# Patient Record
Sex: MALE | Race: WHITE | NOT HISPANIC OR LATINO | Employment: FULL TIME | URBAN - METROPOLITAN AREA
[De-identification: names, ages, dates, MRNs, and addresses within clinical notes are randomized per-mention and may not be internally consistent; named-entity substitution may affect disease eponyms.]

---

## 2017-07-28 ENCOUNTER — HOSPITAL ENCOUNTER (EMERGENCY)
Facility: HOSPITAL | Age: 42
Discharge: HOME | End: 2017-07-28
Attending: EMERGENCY MEDICINE
Payer: MEDICAID

## 2017-07-28 VITALS
OXYGEN SATURATION: 100 % | WEIGHT: 245 LBS | TEMPERATURE: 99 F | HEIGHT: 73 IN | SYSTOLIC BLOOD PRESSURE: 138 MMHG | HEART RATE: 85 BPM | BODY MASS INDEX: 32.47 KG/M2 | DIASTOLIC BLOOD PRESSURE: 77 MMHG | RESPIRATION RATE: 16 BRPM

## 2017-07-28 DIAGNOSIS — R05.9 COUGH: Primary | ICD-10-CM

## 2017-07-28 LAB
ALBUMIN SERPL BCP-MCNC: 3.5 G/DL
ALP SERPL-CCNC: 73 U/L
ALT SERPL W/O P-5'-P-CCNC: 12 U/L
ANION GAP SERPL CALC-SCNC: 7 MMOL/L
AST SERPL-CCNC: 20 U/L
BASOPHILS # BLD AUTO: 0.04 K/UL
BASOPHILS NFR BLD: 0.6 %
BILIRUB SERPL-MCNC: 0.8 MG/DL
BUN SERPL-MCNC: 12 MG/DL
CALCIUM SERPL-MCNC: 8.8 MG/DL
CHLORIDE SERPL-SCNC: 106 MMOL/L
CK SERPL-CCNC: 181 U/L
CO2 SERPL-SCNC: 26 MMOL/L
CREAT SERPL-MCNC: 1.3 MG/DL
DIFFERENTIAL METHOD: ABNORMAL
EOSINOPHIL # BLD AUTO: 0.2 K/UL
EOSINOPHIL NFR BLD: 3.2 %
ERYTHROCYTE [DISTWIDTH] IN BLOOD BY AUTOMATED COUNT: 13.4 %
EST. GFR  (AFRICAN AMERICAN): >60 ML/MIN/1.73 M^2
EST. GFR  (NON AFRICAN AMERICAN): >60 ML/MIN/1.73 M^2
GLUCOSE SERPL-MCNC: 137 MG/DL
HCT VFR BLD AUTO: 40.4 %
HGB BLD-MCNC: 13.9 G/DL
LYMPHOCYTES # BLD AUTO: 1.8 K/UL
LYMPHOCYTES NFR BLD: 25.8 %
MCH RBC QN AUTO: 30.2 PG
MCHC RBC AUTO-ENTMCNC: 34.4 G/DL
MCV RBC AUTO: 88 FL
MONOCYTES # BLD AUTO: 0.7 K/UL
MONOCYTES NFR BLD: 10.6 %
NEUTROPHILS # BLD AUTO: 4 K/UL
NEUTROPHILS NFR BLD: 59.7 %
PLATELET # BLD AUTO: 189 K/UL
PMV BLD AUTO: 9.8 FL
POTASSIUM SERPL-SCNC: 3.8 MMOL/L
PROT SERPL-MCNC: 8 G/DL
RBC # BLD AUTO: 4.6 M/UL
SODIUM SERPL-SCNC: 139 MMOL/L
TSH SERPL DL<=0.005 MIU/L-ACNC: 3.66 UIU/ML
WBC # BLD AUTO: 6.78 K/UL

## 2017-07-28 PROCEDURE — 82550 ASSAY OF CK (CPK): CPT

## 2017-07-28 PROCEDURE — 80053 COMPREHEN METABOLIC PANEL: CPT

## 2017-07-28 PROCEDURE — 25000003 PHARM REV CODE 250: Performed by: PHYSICIAN ASSISTANT

## 2017-07-28 PROCEDURE — 96360 HYDRATION IV INFUSION INIT: CPT

## 2017-07-28 PROCEDURE — 99284 EMERGENCY DEPT VISIT MOD MDM: CPT | Mod: ,,, | Performed by: PHYSICIAN ASSISTANT

## 2017-07-28 PROCEDURE — 96361 HYDRATE IV INFUSION ADD-ON: CPT

## 2017-07-28 PROCEDURE — 84443 ASSAY THYROID STIM HORMONE: CPT

## 2017-07-28 PROCEDURE — 99284 EMERGENCY DEPT VISIT MOD MDM: CPT | Mod: 25

## 2017-07-28 PROCEDURE — 85025 COMPLETE CBC W/AUTO DIFF WBC: CPT

## 2017-07-28 RX ORDER — BENZONATATE 100 MG/1
100 CAPSULE ORAL 3 TIMES DAILY PRN
Qty: 15 CAPSULE | Refills: 0 | Status: SHIPPED | OUTPATIENT
Start: 2017-07-28 | End: 2017-08-07

## 2017-07-28 RX ORDER — ACETAMINOPHEN 325 MG/1
650 TABLET ORAL
Status: COMPLETED | OUTPATIENT
Start: 2017-07-28 | End: 2017-07-28

## 2017-07-28 RX ADMIN — ACETAMINOPHEN 650 MG: 325 TABLET ORAL at 04:07

## 2017-07-28 RX ADMIN — SODIUM CHLORIDE 1000 ML: 0.9 INJECTION, SOLUTION INTRAVENOUS at 04:07

## 2017-07-28 NOTE — ED PROVIDER NOTES
"Encounter Date: 7/28/2017    SCRIBE #1 NOTE: I, Kenya Ramos, am scribing for, and in the presence of,  Dr. Galan. I have scribed the following portions of the note - the APC attestation. Other sections scribed: Imaging.       History     Chief Complaint   Patient presents with    Emesis     "I have problems with my thyroids. Last week I threw up. Everytime I eat I throw up and when I swallow it hurts".     41-year-old white male with past medical history of hypothyroidism presents to the ED complaining of URI symptoms.  He reports a cough, sore throat, wheezing, and lightheadedness if he does not eat for long periods of time.  He had one episode of emesis one week ago and denies any current nausea.  He states that he thinks that his thyroid is acting up.  He has been compliant with his medications and has not skipped any doses.  He works outside daily but states that he drinks a lot of water and Gatorade.  He denies fever, chills, chest pain, shortness of breath, abdominal pain, dysuria, headache.  He denies tobacco, alcohol, or drug use.      The history is provided by the patient.     Review of patient's allergies indicates:  No Known Allergies  Past Medical History:   Diagnosis Date    Developmental delay     Hypothyroidism 09/19/2016     Past Surgical History:   Procedure Laterality Date    CATARACT EXTRACTION W/ INTRAOCULAR LENS IMPLANT      CHOLECYSTECTOMY       History reviewed. No pertinent family history.  Social History   Substance Use Topics    Smoking status: Never Smoker    Smokeless tobacco: Never Used    Alcohol use No     Review of Systems   Constitutional: Positive for fatigue. Negative for chills and fever.   HENT: Positive for sore throat. Negative for congestion and rhinorrhea.    Eyes: Negative for photophobia and visual disturbance.   Respiratory: Positive for cough and wheezing. Negative for shortness of breath.    Cardiovascular: Negative for chest pain.   Gastrointestinal: " Positive for vomiting (x1 one week ago). Negative for abdominal pain, constipation, diarrhea and nausea.   Genitourinary: Negative for dysuria and hematuria.   Musculoskeletal: Positive for back pain. Negative for neck pain and neck stiffness.   Skin: Negative for rash and wound.   Neurological: Positive for light-headedness. Negative for dizziness, syncope, weakness, numbness and headaches.   Psychiatric/Behavioral: Negative for confusion.       Physical Exam     Initial Vitals [07/28/17 1349]   BP Pulse Resp Temp SpO2   (!) 144/79 104 20 97.7 °F (36.5 °C) 98 %      MAP       100.67         Physical Exam    Nursing note and vitals reviewed.  Constitutional: He appears well-developed and well-nourished. He is not diaphoretic. No distress.   HENT:   Head: Normocephalic and atraumatic.   Right Ear: Tympanic membrane, external ear and ear canal normal.   Left Ear: Tympanic membrane, external ear and ear canal normal.   Mouth/Throat: Uvula is midline, oropharynx is clear and moist and mucous membranes are normal.   Neck: Normal range of motion. Neck supple.   Cardiovascular: Normal rate, regular rhythm and normal heart sounds. Exam reveals no gallop and no friction rub.    No murmur heard.  Pulmonary/Chest: Breath sounds normal. He has no wheezes. He has no rhonchi. He has no rales. He exhibits no tenderness.   Abdominal: Soft. Bowel sounds are normal. There is no tenderness. There is no rebound and no guarding.   Musculoskeletal: Normal range of motion.   Neurological: He is alert and oriented to person, place, and time.   Skin: Skin is warm and dry. No rash noted. No erythema.   Psychiatric: He has a normal mood and affect.         ED Course   Procedures  Labs Reviewed   CBC W/ AUTO DIFFERENTIAL - Abnormal; Notable for the following:        Result Value    Hemoglobin 13.9 (*)     All other components within normal limits   COMPREHENSIVE METABOLIC PANEL - Abnormal; Notable for the following:     Glucose 137 (*)      Anion Gap 7 (*)     All other components within normal limits   TSH   CK         Imaging Results          X-Ray Chest PA And Lateral (Final result)  Result time 07/28/17 16:48:21    Final result by Bobby Lemons III, MD (07/28/17 16:48:21)                 Impression:     No acute process seen.      Electronically signed by: BOBBY LEMONS  Date:     07/28/17  Time:    16:48              Narrative:    2 views: The heart size is normal.  Lungs are clear.                                X-Rays:   Independently Interpreted Readings:   Chest X-Ray: No acute process     Medical Decision Making:   History:   Old Medical Records: I decided to obtain old medical records.  Independently Interpreted Test(s):   I have ordered and independently interpreted X-rays - see prior notes.  Clinical Tests:   Lab Tests: Ordered and Reviewed  Radiological Study: Ordered and Reviewed       APC / Resident Notes:   41-year-old white male with past medical history of hypothyroidism presents to the ED complaining of URI symptoms.  Tachycardic at 104.  Regular rhythm without murmurs.  Lungs are clear to auscultation.  Abdomen is soft and nontender.  Differential diagnosis includes but isn't limited to viral URI, pneumonia, hyperthyroidism, rhabdomyolysis. Will get labs and CXR.    CBC does not show any signs of leukocytosis with WBC 6.78.  H/H 13.9/40.4.  CMP without any acute abnormality.  TSH within normal limits at 3.658.  CPK within normal limits at 181.    Chest x-ray with no acute intrathoracic abnormality.    I d not feel the patient needs any further labs or imaging at this time.  Stable for discharge.    He was discharged with a prescription for tessalon.  He will follow up with his PCP.  All of the patient's questions were answered.  I reviewed the patient's chart, labs, and imaging and discussed the case with my supervising physician.          Scribe Attestation:   Scribe #1: I performed the above scribed service and the  documentation accurately describes the services I performed. I attest to the accuracy of the note.    Attending Attestation:     Physician Attestation Statement for NP/PA:   I discussed this assessment and plan of this patient with the NP/PA, but I did not personally examine the patient. The face to face encounter was performed by the NP/PA.    Other NP/PA Attestation Additions:    History of Present Illness: 41 year old male presents with concern for thyroid abnormality. He threw up one time last week. Labs unremarkable. Pt stable for discharge.         Physician Attestation for Scribe:  Physician Attestation Statement for Scribe #1: I, Dr. Galan, reviewed documentation, as scribed by Kenya Ramos in my presence, and it is both accurate and complete.                 ED Course     Clinical Impression:   The encounter diagnosis was Cough.    Disposition:   Disposition: Discharged  Condition: Stable                        Iesha Galvan PA-C  07/28/17 6950

## 2017-07-28 NOTE — ED TRIAGE NOTES
"Pt presents to the ED c/o emesis that occurred one time last Friday. Pt reports his emesis contained blood. Pt states, "It's my male thyroid." Pt also reports wheezing this AM. Pt states, "I think it may be bronchitis."  "

## 2017-09-05 ENCOUNTER — OFFICE VISIT (OUTPATIENT)
Dept: URGENT CARE | Facility: CLINIC | Age: 42
End: 2017-09-05
Payer: MEDICAID

## 2017-09-05 VITALS
HEART RATE: 107 BPM | SYSTOLIC BLOOD PRESSURE: 139 MMHG | DIASTOLIC BLOOD PRESSURE: 73 MMHG | RESPIRATION RATE: 18 BRPM | OXYGEN SATURATION: 98 % | TEMPERATURE: 98 F | WEIGHT: 245 LBS | HEIGHT: 73 IN | BODY MASS INDEX: 32.47 KG/M2

## 2017-09-05 DIAGNOSIS — J06.9 UPPER RESPIRATORY TRACT INFECTION, UNSPECIFIED TYPE: Primary | ICD-10-CM

## 2017-09-05 PROCEDURE — 3008F BODY MASS INDEX DOCD: CPT | Mod: S$GLB,,, | Performed by: NURSE PRACTITIONER

## 2017-09-05 PROCEDURE — 94640 AIRWAY INHALATION TREATMENT: CPT | Mod: S$GLB,,, | Performed by: NURSE PRACTITIONER

## 2017-09-05 PROCEDURE — 99203 OFFICE O/P NEW LOW 30 MIN: CPT | Mod: 25,S$GLB,, | Performed by: NURSE PRACTITIONER

## 2017-09-05 RX ORDER — ALBUTEROL SULFATE 0.83 MG/ML
2.5 SOLUTION RESPIRATORY (INHALATION)
Status: COMPLETED | OUTPATIENT
Start: 2017-09-05 | End: 2017-09-05

## 2017-09-05 RX ORDER — DEXAMETHASONE SODIUM PHOSPHATE 100 MG/10ML
10 INJECTION INTRAMUSCULAR; INTRAVENOUS
Status: COMPLETED | OUTPATIENT
Start: 2017-09-05 | End: 2017-09-05

## 2017-09-05 RX ORDER — BENZONATATE 200 MG/1
200 CAPSULE ORAL 3 TIMES DAILY PRN
Qty: 30 CAPSULE | Refills: 0 | Status: SHIPPED | OUTPATIENT
Start: 2017-09-05 | End: 2017-09-15

## 2017-09-05 RX ORDER — AZITHROMYCIN 250 MG/1
TABLET, FILM COATED ORAL
Qty: 6 TABLET | Refills: 0 | Status: SHIPPED | OUTPATIENT
Start: 2017-09-05 | End: 2018-02-27

## 2017-09-05 RX ADMIN — DEXAMETHASONE SODIUM PHOSPHATE 10 MG: 100 INJECTION INTRAMUSCULAR; INTRAVENOUS at 05:09

## 2017-09-05 RX ADMIN — ALBUTEROL SULFATE 2.5 MG: 0.83 SOLUTION RESPIRATORY (INHALATION) at 05:09

## 2017-09-05 NOTE — PROGRESS NOTES
"Subjective:       Patient ID: Sonu Antunez is a 41 y.o. male.    Vitals:  height is 6' 1" (1.854 m) and weight is 111.1 kg (245 lb). His temperature is 98 °F (36.7 °C). His blood pressure is 139/73 and his pulse is 107. His respiration is 18 and oxygen saturation is 98%.     Chief Complaint: Cough    This is a 41 y.o. male with Past Medical History:  No date: Developmental delay  09/19/2016: Hypothyroidism   who presents today with a chief complaint of cough and congestion.         Cough   This is a new problem. The current episode started in the past 7 days. The problem has been gradually worsening. The problem occurs constantly. The cough is productive of sputum. Associated symptoms include headaches, nasal congestion, postnasal drip and sweats. Pertinent negatives include no chest pain, chills, ear pain, eye redness, fever, myalgias, sore throat, shortness of breath or wheezing. Nothing aggravates the symptoms. He has tried nothing for the symptoms. His past medical history is significant for pneumonia. There is no history of asthma or COPD.     Review of Systems   Constitution: Negative for chills, fever and malaise/fatigue.   HENT: Positive for congestion and postnasal drip. Negative for ear pain, hoarse voice and sore throat.    Eyes: Negative for discharge and redness.   Cardiovascular: Negative for chest pain, dyspnea on exertion and leg swelling.   Respiratory: Positive for cough. Negative for shortness of breath, sputum production and wheezing.    Musculoskeletal: Negative for myalgias.   Gastrointestinal: Negative for abdominal pain and nausea.   Neurological: Positive for headaches.       Objective:      Physical Exam   Constitutional: He is oriented to person, place, and time. He appears well-developed and well-nourished. He is cooperative.  Non-toxic appearance. He does not appear ill. No distress.   HENT:   Head: Normocephalic and atraumatic.   Right Ear: Hearing, tympanic membrane, external " ear and ear canal normal.   Left Ear: Hearing, tympanic membrane, external ear and ear canal normal.   Nose: Nose normal. No mucosal edema, rhinorrhea or nasal deformity. No epistaxis. Right sinus exhibits no maxillary sinus tenderness and no frontal sinus tenderness. Left sinus exhibits no maxillary sinus tenderness and no frontal sinus tenderness.   Mouth/Throat: Uvula is midline, oropharynx is clear and moist and mucous membranes are normal. No trismus in the jaw. Normal dentition. No uvula swelling. No posterior oropharyngeal erythema.   Eyes: Conjunctivae and lids are normal. No scleral icterus.   Sclera clear bilat   Neck: Trachea normal, full passive range of motion without pain and phonation normal. Neck supple.   Cardiovascular: Normal rate, regular rhythm, normal heart sounds, intact distal pulses and normal pulses.    Pulmonary/Chest: Effort normal. No respiratory distress. He has decreased breath sounds (THROUGHOUT. INTERMITTENT DRY COUGH).   LUNGS SOUND IMPROVED AFTER TREATMENT ON AUSCULATION  PATIENT REPORTS FEELING BETTER   Abdominal: Soft. Normal appearance and bowel sounds are normal. He exhibits no distension. There is no tenderness.   Musculoskeletal: Normal range of motion. He exhibits no edema or deformity.   Neurological: He is alert and oriented to person, place, and time. He exhibits normal muscle tone. Coordination normal.   Skin: Skin is warm, dry and intact. He is not diaphoretic. No pallor.   Psychiatric: He has a normal mood and affect. His speech is normal and behavior is normal. Judgment and thought content normal. Cognition and memory are normal.   Nursing note and vitals reviewed.    NORMAL CHEST XRAY  Assessment:       1. Upper respiratory tract infection, unspecified type        Plan:         Upper respiratory tract infection, unspecified type  -     albuterol nebulizer solution 2.5 mg; Take 3 mLs (2.5 mg total) by nebulization one time.  -     azithromycin (ZITHROMAX Z-FIONA) 250 MG  tablet; TAKE 2 TABLETS ON DAY 1, THEN 1 TABLET DAILY X 4 DAYS.  Dispense: 6 tablet; Refill: 0  -     benzonatate (TESSALON) 200 MG capsule; Take 1 capsule (200 mg total) by mouth 3 (three) times daily as needed for Cough.  Dispense: 30 capsule; Refill: 0  -     X-Ray Chest PA And Lateral; Future; Expected date: 09/05/2017    Please drink plenty of fluids.  Please get plenty of rest.  Please return here or go to the Emergency Department for any concerns or worsening of condition.  If you were prescribed antibiotics, please take them to completion.  If you were given wait & see antibiotics, please wait 5-7 days before taking them, and only take them if your symptoms have worsened or not improved.  If you do begin taking the antibiotics, please take them to completion.  If you were prescribed a narcotic medication, do not drive or operate heavy equipment or machinery while taking these medications.  If you were given a steroid shot in the clinic and have also been given a prescription for a steroid such as Prednisone or a Medrol Dose Pack, please begin taking them tomorrow.  If you do not have Hypertension or any history of palpitations, it is ok to take over the counter Sudafed or Mucinex D or Allegra-D or Claritin-D or Zyrtec-D.  If you do take one of the above, it is ok to combine that with plain over the counter Mucinex or Allegra or Claritin or Zyrtec.  If for example you are taking Zyrtec -D, you can combine that with Mucinex, but not Mucinex-D.  If you are taking Mucinex-D, you can combine that with plain Allegra or Claritin or Zyrtec.   If you do have Hypertension or palpitations, it is safe to take Coricidin HBP for relief of sinus symptoms.  If not allergic, please take over the counter Tylenol (Acetaminophen) and/or Motrin (Ibuprofen) as directed for control of pain and/or fever.  Please follow up with your primary care doctor or specialist as needed.    If you  smoke, please stop smoking.

## 2017-09-05 NOTE — LETTER
September 5, 2017      [facility]  [address]  [facility] [address]  Phone: [phone]  Fax: [phone]       Patient: Sonu Antunez   YOB: 1975  Date of Visit: 09/05/2017    To Whom It May Concern:    Lianna Antunez  was at [facility] on 09/05/2017. He may return to work/school on 09/08/2017 with no restrictions. If you have any questions or concerns, or if I can be of further assistance, please do not hesitate to contact me.    Sincerely,    Luke Pantoja MA

## 2017-09-05 NOTE — PATIENT INSTRUCTIONS
NORMAL CHEST XRAY      Viral Upper Respiratory Illness with Wheezing (Adult)  You have a viral upper respiratory illness (URI), which is another term for the common cold. When the infection causes a lot of irritation, the air passages can go into spasm. This causes wheezing and shortness of breath.    This illness is contagious during the first few days. It is spread through the air by coughing and sneezing. It may also be spread by direct contact (touching the sick person and then touching your own eyes, nose, or mouth). Frequent handwashing will decrease the risk.  Most viral illnesses go away within 7 to 10 days with rest and simple home remedies. Sometimes the illness may last for several weeks. Antibiotics will not kill a virus, and they are generally not prescribed for this condition.  Home care  · If symptoms are severe, rest at home for the first 2 to 3 days. When you resume activity, don't let yourself get too tired.  · Avoid being exposed to cigarette smoke (yours or others).  · You may use acetaminophen or ibuprofen to control pain and fever, unless another medicine was prescribed. (Note: If you have chronic liver or kidney disease, have ever had a stomach ulcer or gastrointestinal bleeding, or are taking blood-thinning medicines, talk with your healthcare provider before using these medicines.) Aspirin should never be given to anyone under 18 years of age who is ill with a viral infection or fever. It may cause severe liver or brain damage.  · Your appetite may be poor, so a light diet is fine. Avoid dehydration by drinking 6 to 8 glasses of fluids per day (water, soft drinks, juices, tea, or soup). Extra fluids will help loosen secretions in the nose and lungs.  · Over-the-counter cold medicines will not shorten the length of time youre sick, but they may be helpful for the following symptoms: cough, sore throat, and nasal and sinus congestion. (Note: Do not use decongestants if you have high blood  pressure.)  Follow-up care  Follow up with your healthcare provider, or as advised.  When to seek medical advice  Call your healthcare provider right away if any of these occur:  · Cough with lots of colored sputum (mucus)  · Severe headache; face, neck, or ear pain  · Difficulty swallowing due to throat pain  · Fever of 100.4ºF (38ºC) or higher, or as directed by your healthcare provider  Call 911, or get immediate medical care  Call emergency services right away if any of these occur:  · Chest pain, shortness of breath, worsening wheezing, or difficulty breathing  · Coughing up blood  · Inability to swallow due to throat pain  Date Last Reviewed: 9/13/2015  © 1464-7778 [employer]. [address] [place] [address], [place] [address]. All rights reserved. This information is not intended as a substitute for professional medical care. Always follow your healthcare professional's instructions.

## 2017-12-27 ENCOUNTER — HOSPITAL ENCOUNTER (EMERGENCY)
Facility: HOSPITAL | Age: 42
Discharge: HOME | End: 2017-12-27
Payer: MEDICARE

## 2017-12-27 VITALS
OXYGEN SATURATION: 99 % | SYSTOLIC BLOOD PRESSURE: 138 MMHG | WEIGHT: 240 LBS | RESPIRATION RATE: 20 BRPM | HEART RATE: 89 BPM | TEMPERATURE: 98 F | DIASTOLIC BLOOD PRESSURE: 72 MMHG | BODY MASS INDEX: 31.66 KG/M2

## 2017-12-27 DIAGNOSIS — J06.9 UPPER RESPIRATORY TRACT INFECTION, UNSPECIFIED TYPE: ICD-10-CM

## 2017-12-27 DIAGNOSIS — R05.9 COUGH: Primary | ICD-10-CM

## 2017-12-27 PROCEDURE — 99283 EMERGENCY DEPT VISIT LOW MDM: CPT

## 2017-12-27 PROCEDURE — 99283 EMERGENCY DEPT VISIT LOW MDM: CPT | Mod: ,,, | Performed by: EMERGENCY MEDICINE

## 2017-12-27 RX ORDER — BENZONATATE 100 MG/1
100 CAPSULE ORAL 3 TIMES DAILY PRN
Qty: 15 CAPSULE | Refills: 0 | Status: SHIPPED | OUTPATIENT
Start: 2017-12-27 | End: 2017-12-27

## 2017-12-27 RX ORDER — BENZONATATE 100 MG/1
100 CAPSULE ORAL 3 TIMES DAILY PRN
Qty: 15 CAPSULE | Refills: 0 | Status: SHIPPED | OUTPATIENT
Start: 2017-12-27 | End: 2018-01-01

## 2017-12-27 NOTE — ED PROVIDER NOTES
Encounter Date: 12/27/2017       History     Chief Complaint   Patient presents with    Cough     Since 12-23, states needs Dr note to return to work    Sore Throat     HPI     41yo man with hx developmental delay, hypothyroidism presents with dry cough, congestion, sore throat since Saturday. Denies fever, nausea, vomiting, diarrhea, rash. Has taken over the counter cold medicine without much improvement. States he needs a note to return to work.     Review of patient's allergies indicates:  No Known Allergies  Past Medical History:   Diagnosis Date    Developmental delay     Hypothyroidism 09/19/2016     Past Surgical History:   Procedure Laterality Date    CATARACT EXTRACTION W/ INTRAOCULAR LENS IMPLANT      CHOLECYSTECTOMY       Family History   Problem Relation Age of Onset    Family history unknown: Yes     Social History   Substance Use Topics    Smoking status: Never Smoker    Smokeless tobacco: Never Used    Alcohol use No     Review of Systems   Constitutional: Negative for fever.   HENT: Positive for congestion, rhinorrhea and sore throat.    Respiratory: Positive for cough. Negative for shortness of breath.    Cardiovascular: Negative for chest pain.   Gastrointestinal: Negative for abdominal pain, diarrhea, nausea and vomiting.   Genitourinary: Negative for dysuria.   Musculoskeletal: Negative for myalgias.   Neurological: Negative for syncope.   All other systems reviewed and are negative.      Physical Exam     Initial Vitals [12/27/17 0802]   BP Pulse Resp Temp SpO2   138/72 89 20 97.9 °F (36.6 °C) 99 %      MAP       94         Physical Exam  Appearance: No acute distress.  Skin: No rashes seen.  Good turgor.  No abrasions.  No ecchymoses.  Eyes: No conjunctival injection.  ENT: Oropharynx clear.    Chest: Clear to auscultation bilaterally.  Good air movement.  No wheezes.  No rhonchi.  Cardiovascular: Regular rate and rhythm.  No murmurs. No gallops. No rubs.  Abdomen: Soft.  Not  distended.  Nontender.  No guarding.  No rebound. No Masses  Musculoskeletal: Good range of motion all joints.  No deformities.  Neck supple.  No meningismus.  Neurologic: Motor intact.  Sensation intact.  Cerebellar intact.  Cranial nerves intact.  Mental Status:  Alert and oriented x 3.  Appropriate, conversant.        ED Course   Procedures  Labs Reviewed - No data to display       MDM: Flower Fink MD HO-II 8:53 AM  This is a 43yo man with hx hypothyroidism, developmental delay presenting with five days of cold symptoms. Differential includes URI, influenza, pneumonia. Patient is well appearing, afebrile and with clear breath sounds bilaterally. Plan for discharge with tessalon pearls and return precautions.         Attending Note:  Physician Attestation Statement: I have personally seen and examined this patient. As the supervising MD I agree with the above history. As the supervising MD I agree with the above PE. As the supervising MD I agree with the above treatment, course, plan, and disposition.                         ED Course      Clinical Impression:   The encounter diagnosis was Cough.                           Flower Fink MD  Resident  12/27/17 [id number]       Atul Botello MD  01/03/18 [id number]

## 2017-12-27 NOTE — ED TRIAGE NOTES
Presents to ER with complaint of sore throat, cough and chest congestion since Saturday.    GENERAL: The patient is well-developed and well-nourished mentally challenged male in no apparent distress. Alert and oriented x3.                                                HEENT: Head is normocephalic and atraumatic. Extraocular muscles are intact. Pupils are equal, round, and reactive to light and accommodation. Nares appeared normal. Mouth is well hydrated and without lesions. Mucous membranes are moist. Posterior pharynx clear of any exudate or lesions.    NECK: Supple. No carotid bruits. No lymphadenopathy or thyromegaly.    LUNGS: Clear to auscultation.    HEART: Regular rate and rhythm without murmur.     ABDOMEN: Soft, nontender, and nondistended. Positive bowel sounds. No hepatosplenomegaly was noted.     EXTREMITIES: Without any cyanosis, clubbing, rash, lesions or edema.     NEUROLOGIC: Cranial nerves II through XII are grossly intact.     PSYCHIATRIC: Flat affect, but denies suicidal or homicidal ideations.    SKIN: No ulceration or induration present.

## 2017-12-27 NOTE — LETTER
"  [facility]  [address]  [facility] [address]  Phone: [phone]  Fax: [phone]   December 27, 2017    Patient: Sonu Antunez (Terry)   YOB: 1975   Date of Visit: 12/27/2017   Patient ID [id number]       To Whom It May Concern:    Sonu Antunez (Terry) was seen and treated in our emergency department on 12/27/2017. He may return to work on December 27, 2017.      Sincerely,          ,       "

## 2018-02-27 ENCOUNTER — HOSPITAL ENCOUNTER (EMERGENCY)
Facility: HOSPITAL | Age: 43
Discharge: HOME | End: 2018-02-27
Payer: MEDICARE

## 2018-02-27 VITALS
HEART RATE: 110 BPM | SYSTOLIC BLOOD PRESSURE: 134 MMHG | RESPIRATION RATE: 18 BRPM | TEMPERATURE: 98 F | WEIGHT: 245 LBS | HEIGHT: 73 IN | BODY MASS INDEX: 32.47 KG/M2 | DIASTOLIC BLOOD PRESSURE: 84 MMHG | OXYGEN SATURATION: 99 %

## 2018-02-27 DIAGNOSIS — H61.20 IMPACTED CERUMEN, UNSPECIFIED LATERALITY: ICD-10-CM

## 2018-02-27 DIAGNOSIS — H61.23 IMPACTED CERUMEN OF BOTH EARS: ICD-10-CM

## 2018-02-27 DIAGNOSIS — J06.9 UPPER RESPIRATORY TRACT INFECTION, UNSPECIFIED TYPE: ICD-10-CM

## 2018-02-27 DIAGNOSIS — J02.9 VIRAL PHARYNGITIS: Primary | ICD-10-CM

## 2018-02-27 PROCEDURE — 99283 EMERGENCY DEPT VISIT LOW MDM: CPT

## 2018-02-27 RX ORDER — LEVOTHYROXINE SODIUM 100 UG/1
100 TABLET ORAL DAILY
COMMUNITY

## 2018-02-27 RX ORDER — NAPROXEN 500 MG/1
500 TABLET ORAL EVERY 12 HOURS PRN
Qty: 20 TABLET | Refills: 0 | Status: SHIPPED | OUTPATIENT
Start: 2018-02-27 | End: 2018-06-19

## 2018-02-27 NOTE — ED TRIAGE NOTES
Patient states sore throat, swollen lymph nodes, kenny ear pain, worse after cleaning ears out  onset Sunday. Needs work excuse.

## 2018-02-27 NOTE — ED PROVIDER NOTES
Encounter Date: 2/27/2018       History     Chief Complaint   Patient presents with    Lymphadenopathy     ear ache    Sore Throat     HPI this is a 42-year-old man with a history of developmental delay and hypothyroidism here with complaints of sore throat, runny nose, nasal congestion and postnasal drip ×4 days.  Also complains of associated ear pain after cleaning his ears 2 days ago.      Review of patient's allergies indicates:  No Known Allergies  Past Medical History:   Diagnosis Date    Cellulitis     Developmental delay     Hypothyroidism 09/19/2016    URI (upper respiratory infection)      Past Surgical History:   Procedure Laterality Date    CATARACT EXTRACTION W/ INTRAOCULAR LENS IMPLANT      CHOLECYSTECTOMY       Family History   Problem Relation Age of Onset    Family history unknown: Yes     Social History   Substance Use Topics    Smoking status: Never Smoker    Smokeless tobacco: Never Used    Alcohol use No     Review of Systems  GENERAL:   (-) fever,  (-) chills,  (-) general weakness,  EYES:  (-) Eye discharge, (-) Eye redness,  ENT:  (+) sore throat, (+) Nasal congestion  RESPIRATORY:   (-) SOB,  (+) cough,  GASTROINTESTINAL:  (-) Nausea, (-) Vomiting,  INTEGUMENTARY:  (-) Abrasion(s),  MUSCULOSKELETAL:  (-) Joint pain, (-) Myalgias,  ALLERGIC/IMMUN:  (+) Rhinorrhea,        Physical Exam     Initial Vitals [02/27/18 0900]   BP Pulse Resp Temp SpO2   134/84 110 18 98 °F (36.7 °C) 99 %      MAP       100.67         Physical Exam  Physical Exam     Nursing note and vitals reviewed.    Constitutional: Well developed, well nourished.  No acute distress    Head: Atraumatic. Normocephalic.     Eyes: PERRL. EOMI. Conjunctivae- normal    ENT: Deviated septum.Mucous membranes are moist and intact. Oropharynx is clear and symmetric.  Bilateral cerumen impaction.  Patent airway.    Neck: Soft, Supple. No C-spine TTP     Cardiovascular: Regular rate. Regular rhythm. No murmurs, rubs, or  gallops.    Pulmonary/Chest: No respiratory distress. BS Clear to auscultation bilaterally. No rales or rhonchi. Increased respiratory effort    Abdominal: Soft and non-distended. There is no tenderness. No rebound, guarding, or rigidity.    Back: Full ROM. Nontender.    Extremities: Musculoskeletal: Normal range of motion. Pt exhibits no edema and no tenderness.    Skin: Skin is warm and dry. No rash.    Neurological: awake and alert . Appropriate. Moves all extremities symmetrically. No motor/sensory deficits    Psychiatric: Good eye contact. Normal interaction  ED Course   Procedures  Labs Reviewed - No data to display     This is urgent evaluation for 42-year-old man here with URI symptoms complaining of a sore throat and ear pain.  Upon evaluation the patient has a clear oropharynx with no significant exudate, erythema or swelling.  There is very mild cervical lymphadenopathy.  I do not suspect bacterial pharyngitis as the patient has no fever and has multiple URI symptoms.  Will treat his infection with Debr and his URI symptoms with supportive care.  Patient struck her to follow up with his PCP within 2-3 days for reevaluation and further treatment.ox                           Clinical Impression:   The primary encounter diagnosis was Viral pharyngitis. Diagnoses of Upper respiratory tract infection, unspecified type and Impacted cerumen, unspecified laterality were also pertinent to this visit.                           Sharon Abdalla MD  02/27/18 4959

## 2018-02-27 NOTE — ED NOTES
Patient identifiers verified and correct for Mr Antunez  C/C: Sore throat  APPEARANCE: awake and alert in NAD.  SKIN: warm, dry and intact. No breakdown or bruising.  MUSCULOSKELETAL: Patient moving all extremities spontaneously, no obvious swelling or deformities noted. Ambulates independently.  RESPIRATORY: Denies shortness of breath.Respirations unlabored.   CARDIAC: Denies CP, 2+ distal pulses; no peripheral edema  ABDOMEN: S/ND/NT, Denies nausea  : voids spontaneously, denies difficulty  Neurologic: AAO x 4; follows commands equal strength in all extremities; denies numbness/tingling. Denies dizziness Positive sore throat, kenny ear pain worse after cleaning ears out.

## 2018-06-14 ENCOUNTER — HOSPITAL ENCOUNTER (EMERGENCY)
Facility: HOSPITAL | Age: 43
Discharge: HOME | End: 2018-06-14
Attending: EMERGENCY MEDICINE
Payer: MEDICARE

## 2018-06-14 VITALS
OXYGEN SATURATION: 98 % | TEMPERATURE: 98 F | DIASTOLIC BLOOD PRESSURE: 100 MMHG | SYSTOLIC BLOOD PRESSURE: 150 MMHG | HEART RATE: 80 BPM | RESPIRATION RATE: 18 BRPM

## 2018-06-14 DIAGNOSIS — M25.519 SHOULDER PAIN: ICD-10-CM

## 2018-06-14 PROCEDURE — 99284 EMERGENCY DEPT VISIT MOD MDM: CPT | Mod: 25

## 2018-06-14 PROCEDURE — 96372 THER/PROPH/DIAG INJ SC/IM: CPT

## 2018-06-14 PROCEDURE — 63600175 PHARM REV CODE 636 W HCPCS: Performed by: PHYSICIAN ASSISTANT

## 2018-06-14 PROCEDURE — 99283 EMERGENCY DEPT VISIT LOW MDM: CPT | Mod: ,,, | Performed by: PHYSICIAN ASSISTANT

## 2018-06-14 RX ORDER — NAPROXEN 500 MG/1
500 TABLET ORAL 2 TIMES DAILY WITH MEALS
Qty: 20 TABLET | Refills: 0 | Status: SHIPPED | OUTPATIENT
Start: 2018-06-14 | End: 2018-06-19 | Stop reason: DRUGHIGH

## 2018-06-14 RX ORDER — METHOCARBAMOL 750 MG/1
750 TABLET, FILM COATED ORAL 3 TIMES DAILY
Qty: 30 TABLET | Refills: 0 | Status: SHIPPED | OUTPATIENT
Start: 2018-06-14 | End: 2018-06-19

## 2018-06-14 RX ORDER — KETOROLAC TROMETHAMINE 30 MG/ML
10 INJECTION, SOLUTION INTRAMUSCULAR; INTRAVENOUS
Status: COMPLETED | OUTPATIENT
Start: 2018-06-14 | End: 2018-06-14

## 2018-06-14 RX ORDER — ORPHENADRINE CITRATE 30 MG/ML
60 INJECTION INTRAMUSCULAR; INTRAVENOUS
Status: COMPLETED | OUTPATIENT
Start: 2018-06-14 | End: 2018-06-14

## 2018-06-14 RX ADMIN — KETOROLAC TROMETHAMINE 10 MG: 30 INJECTION, SOLUTION INTRAMUSCULAR at 03:06

## 2018-06-14 RX ADMIN — ORPHENADRINE CITRATE 60 MG: 30 INJECTION INTRAMUSCULAR; INTRAVENOUS at 03:06

## 2018-06-14 NOTE — DISCHARGE INSTRUCTIONS
Use naproxen and Robaxin as needed for pain  Follow up with a primary care doctor  Return to the ER for any new symptoms

## 2018-06-14 NOTE — ED TRIAGE NOTES
Pt reports he began to have left sided shoulder and neck pain a few days ago. Pt reports pain has gotten worse tonight and it woke him up. Pt reports it feels like 'some one is stabbing through my neck straight to my lung'. Pt reports some SOB with pain. Denies any chest pain. Pt states he took two tylenol with no releif earlier this evienng. Pt does a physical labor type job.

## 2018-06-14 NOTE — ED PROVIDER NOTES
Encounter Date: 6/14/2018       History     Chief Complaint   Patient presents with    Shoulder Pain     Pt brought in by  EMS for left shoulder pain radiating to neck x 1 week.Non traumatic. Denies chest pain.      42 year old male with a PMHx of developmental delay, hypothyroidism who presents to the ED with L shoulder and neck pain. He reports that after heavy lifting last week he began having severe L sided neck pain that radiates into his posterior shoulder and has been constant since. Denies any trauma or acute incident. He describes that pain as sharp, constant, worse with movement, better with rest. No relief with Tylenol. Denies any fevers, chills, swelling, redness, rash, chest pain, SOB, or any other medical complaints.           Review of patient's allergies indicates:  No Known Allergies  Past Medical History:   Diagnosis Date    Cellulitis     Developmental delay     Hypothyroidism 09/19/2016    URI (upper respiratory infection)      Past Surgical History:   Procedure Laterality Date    CATARACT EXTRACTION W/ INTRAOCULAR LENS IMPLANT      CHOLECYSTECTOMY       Family History   Problem Relation Age of Onset    Family history unknown: Yes     Social History   Substance Use Topics    Smoking status: Never Smoker    Smokeless tobacco: Never Used    Alcohol use No     Review of Systems   Constitutional: Negative for chills, diaphoresis and fever.   HENT: Negative for sore throat.    Respiratory: Negative for shortness of breath.    Cardiovascular: Negative for chest pain.   Gastrointestinal: Negative for nausea and vomiting.   Genitourinary: Negative for dysuria.   Musculoskeletal: Positive for neck pain. Negative for back pain and joint swelling.        +left shoulder pain   Skin: Negative for rash.   Neurological: Negative for weakness, numbness and headaches.   Hematological: Does not bruise/bleed easily.       Physical Exam     Initial Vitals [06/14/18 0330]   BP Pulse Resp Temp SpO2   (!)  150/100 80 18 98.4 °F (36.9 °C) 98 %      MAP       --         Physical Exam    Constitutional: Vital signs are normal. He appears well-developed and well-nourished.   HENT:   Head: Normocephalic and atraumatic.   Eyes: Conjunctivae are normal.   Neck: Muscular tenderness present.   Cardiovascular: Normal rate and regular rhythm.   Abdominal: Soft. Normal appearance and bowel sounds are normal.   Musculoskeletal: Normal range of motion.   Tenderness to palpation over midline C spine and posterior aspect of L shoulder. No crepitus or step offs. Patient is holding left arm close to body for comfort. Decreased ROM secondary to pain. Severe pain with abduction of L arm at shoulder. No swelling or erythema.    Neurological: He is alert and oriented to person, place, and time.   Skin: Skin is warm and intact.   Psychiatric: He has a normal mood and affect. His speech is normal and behavior is normal. Cognition and memory are normal.         ED Course   Procedures  Labs Reviewed - No data to display       No orders to display        Medical Decision Making:   ED Management:  42-year-old male with pain to the left shoulder.  No blunt force trauma.  No acute findings on x-rays.  Pain improved with medication in the ED.  Patient will be discharged home with muscle relaxants and NSAIDs.  Recommend follow-up with PCP                      Clinical Impression:   Diagnoses of Shoulder pain and Shoulder pain were pertinent to this visit.      Disposition:   Disposition: Discharged  Condition: Stable                        Anirudh Varghese PA-C  06/14/18 0507

## 2018-06-19 ENCOUNTER — HOSPITAL ENCOUNTER (EMERGENCY)
Facility: HOSPITAL | Age: 43
Discharge: HOME | End: 2018-06-19
Attending: EMERGENCY MEDICINE
Payer: MEDICARE

## 2018-06-19 VITALS
BODY MASS INDEX: 33.13 KG/M2 | SYSTOLIC BLOOD PRESSURE: 133 MMHG | HEART RATE: 94 BPM | RESPIRATION RATE: 18 BRPM | OXYGEN SATURATION: 99 % | DIASTOLIC BLOOD PRESSURE: 98 MMHG | TEMPERATURE: 98 F | HEIGHT: 73 IN | WEIGHT: 250 LBS

## 2018-06-19 DIAGNOSIS — M54.2 NECK PAIN: ICD-10-CM

## 2018-06-19 DIAGNOSIS — M62.838 MUSCLE SPASMS OF NECK: Primary | ICD-10-CM

## 2018-06-19 PROCEDURE — 99283 EMERGENCY DEPT VISIT LOW MDM: CPT | Mod: ,,, | Performed by: EMERGENCY MEDICINE

## 2018-06-19 PROCEDURE — 25000003 PHARM REV CODE 250: Performed by: EMERGENCY MEDICINE

## 2018-06-19 PROCEDURE — 93010 ELECTROCARDIOGRAM REPORT: CPT | Mod: ,,, | Performed by: INTERNAL MEDICINE

## 2018-06-19 PROCEDURE — 99283 EMERGENCY DEPT VISIT LOW MDM: CPT | Mod: 25

## 2018-06-19 PROCEDURE — 93005 ELECTROCARDIOGRAM TRACING: CPT

## 2018-06-19 RX ORDER — NAPROXEN 500 MG/1
500 TABLET ORAL EVERY 12 HOURS PRN
Qty: 20 TABLET | Refills: 0 | Status: SHIPPED | OUTPATIENT
Start: 2018-06-19 | End: 2018-06-22

## 2018-06-19 RX ORDER — METHOCARBAMOL 500 MG/1
1000 TABLET, FILM COATED ORAL 3 TIMES DAILY
Qty: 18 TABLET | Refills: 0 | Status: SHIPPED | OUTPATIENT
Start: 2018-06-19 | End: 2018-06-22

## 2018-06-19 RX ORDER — NAPROXEN 500 MG/1
500 TABLET ORAL
Status: COMPLETED | OUTPATIENT
Start: 2018-06-19 | End: 2018-06-19

## 2018-06-19 RX ORDER — METHOCARBAMOL 500 MG/1
1000 TABLET, FILM COATED ORAL
Status: COMPLETED | OUTPATIENT
Start: 2018-06-19 | End: 2018-06-19

## 2018-06-19 RX ADMIN — METHOCARBAMOL 1000 MG: 500 TABLET ORAL at 07:06

## 2018-06-19 RX ADMIN — NAPROXEN 500 MG: 500 TABLET ORAL at 07:06

## 2018-06-20 NOTE — ED TRIAGE NOTES
"Sonu Antunez, a 42 y.o. male presents to the ED c/o left sided neck and shoulder pain going into left arm. Pt reports pulling heavy bags at work Sunday and thinks he pulled a muscle. Denies CP/SOB. Pt reports this aggravated pain but this pain has been going on for "some time"      Chief Complaint   Patient presents with    Neck Pain     and going down my L arm, pulled muscle sunday after lifting garbage bag popped in chest     Review of patient's allergies indicates:  No Known Allergies  Past Medical History:   Diagnosis Date    Cellulitis     Developmental delay     Hypothyroidism 09/19/2016    URI (upper respiratory infection)       Adult Physical Assessment  LOC: Sonu Antunez, 42 y.o. male verified via two identifiers.  The patient is awake, alert, oriented and speaking appropriately at this time.  APPEARANCE: Patient resting comfortably and appears to be in no acute distress at this time. Patient is clean and well groomed, patient's clothing is properly fastened.  SKIN:The skin is warm and dry, color consistent with ethnicity, patient has normal skin turgor and moist mucus membranes, skin intact, no breakdown or brusing noted.  MUSCULOSKELETAL: Patient moving all extremities well, no obvious swelling or deformities noted. Left sided neck and shoulder pain.  RESPIRATORY: Airway is open and patent, respirations are spontaneous, patient has a normal effort and rate, no accessory muscle use noted.  CARDIAC: Patient has a normal rate and rhythm, no periphreal edema noted in any extremity, capillary refill < 3 seconds in all extremities  ABDOMEN: Soft and non tender to palpation, no abdominal distention noted. Bowel sounds present in all four quadrants.  NEUROLOGIC: Eyes open spontaneously, behavior appropriate to situation, follows commands, facial expression symmetrical, bilateral hand grasp equal and even, purposeful motor response noted, normal sensation in all extremities when touched with a finger.   "

## 2018-06-20 NOTE — ED PROVIDER NOTES
Encounter Date: 6/19/2018    SCRIBE #1 NOTE: I, Baudilioeladia Hua, am scribing for, and in the presence of,  Dr. Joseph. I have scribed the entire note.       History     Chief Complaint   Patient presents with    Neck Pain     and going down my L arm, pulled muscle sunday after lifting garbage bag popped in chest     Time patient was seen by the provider: 7:45 PM      The patient is a 42 y.o. Male with PMHx of: developmental delay who presents to the ED with a complaint of neck pain, which began approx 1 week ago. Pain started while straining to lift heavy object. Pain radiates to the left arm and chest. Pain worsened after lifting garbage bags 2 days ago. X-ray from June 14th cervical spin, shoulder, and scapula were negative. He was evaluated at that time for the same injury. Pt was discharged with muscle relaxers. Pt has been non complaint with this regimen as he has been unable to fill the prescription 2/2 developmental delay. He denies any weakness, numbness, vision changes, headache, or shortness of breath.       The history is provided by the patient, a caregiver and medical records.     Review of patient's allergies indicates:  No Known Allergies  Past Medical History:   Diagnosis Date    Cellulitis     Developmental delay     Hypothyroidism 09/19/2016    URI (upper respiratory infection)      Past Surgical History:   Procedure Laterality Date    CATARACT EXTRACTION W/ INTRAOCULAR LENS IMPLANT      CHOLECYSTECTOMY       Family History   Problem Relation Age of Onset    Family history unknown: Yes     Social History   Substance Use Topics    Smoking status: Never Smoker    Smokeless tobacco: Never Used    Alcohol use No     Review of Systems   Constitutional: Negative for fever.   HENT: Negative for sore throat.    Eyes: Negative for visual disturbance.   Respiratory: Negative for shortness of breath.    Cardiovascular: Positive for chest pain.   Gastrointestinal: Negative for nausea.    Genitourinary: Negative for dysuria.   Musculoskeletal: Positive for neck pain. Negative for back pain.        Arm pain   Skin: Negative for rash.   Neurological: Negative for weakness, numbness and headaches.   Hematological: Does not bruise/bleed easily.       Physical Exam     Initial Vitals [06/19/18 1653]   BP Pulse Resp Temp SpO2   (!) 137/99 98 18 98.4 °F (36.9 °C) 100 %      MAP       --         Physical Exam    Nursing note and vitals reviewed.  Constitutional: He appears well-developed and well-nourished. He is not diaphoretic. No distress.   HENT:   Head: Normocephalic and atraumatic.   Mouth/Throat: Oropharynx is clear and moist.   Eyes: EOM are normal. Right eye exhibits no discharge. Left eye exhibits no discharge.   Neck: Neck supple.   No carotid bruits.   Cardiovascular: Normal rate, regular rhythm, normal heart sounds and intact distal pulses. Exam reveals no gallop and no friction rub.    No murmur heard.  Pulmonary/Chest: Breath sounds normal. No respiratory distress.   No chest tenderness    Abdominal: Soft. Bowel sounds are normal. He exhibits no distension. There is no tenderness. There is no rebound and no guarding.   Musculoskeletal:    Pain and spasm to the left upper thoracic strap muscles. No midline thoracic tenderness or step offs    Neurological: He is alert and oriented to person, place, and time. He has normal strength. No cranial nerve deficit.   Skin: Skin is warm and dry. Capillary refill takes less than 2 seconds.         ED Course   Procedures  Labs Reviewed - No data to display  EKG Readings: (Independently Interpreted)   normal sinus rhythm, rate 98, normal axis, normal ST segments, normal t waves        Imaging Results    None          Medical Decision Making:   History:   Old Medical Records: I decided to obtain old medical records.  Initial Assessment:   42 y.o. male with history of developmental delay presents with persistent neck and back spasms. He was recently seen in  the ED for the same complaint and had negative imaging but has been non compliant with his medications. Pt has full strength in his bilateral upper extremities, no midline cervical tenderness to palpation, I doubt acute radicular nerve root compression. There are no meningeal signs or fever to suggest meningitis. There was no significant cervical spine trauma or midline tenderness to suggest cervical spinal fracture. His cranial nerves are intact, there are no carotid bruits, I doubt carotid dissection. No chest pain, I doubt ACS. Pt has zeke spasm and tenderness of the upper thoracic strap muscles and has been non complaint with his medications. I discussed appropriate doses with the pt's assistant and he assures me they will take medications as prescribed. Will discharge with refill of naproxen and robaxin. Provided with extensive return precautions.   Independently Interpreted Test(s):   I have ordered and independently interpreted EKG Reading(s) - see prior notes  Clinical Tests:   Medical Tests: Ordered and Reviewed            Scribe Attestation:   Scribe #1: I performed the above scribed service and the documentation accurately describes the services I performed. I attest to the accuracy of the note.    Attending Attestation:           Physician Attestation for Scribe:      Comments: I, Dr. Camron Joseph, personally performed the services described in this documentation. All medical record entries made by the scribe were at my direction and in my presence.  I have reviewed the chart and agree that the record reflects my personal performance and is accurate and complete. Camron Joseph MD.  12:45 AM 06/20/2018                 Clinical Impression:   The primary encounter diagnosis was Muscle spasms of neck. A diagnosis of Neck pain was also pertinent to this visit.      Disposition:   Disposition: Discharged  Condition: Stable                        Camron Joseph MD  06/20/18 0045

## 2018-06-21 ENCOUNTER — HOSPITAL ENCOUNTER (EMERGENCY)
Facility: HOSPITAL | Age: 43
Discharge: HOME | End: 2018-06-22
Attending: EMERGENCY MEDICINE
Payer: MEDICARE

## 2018-06-21 DIAGNOSIS — T14.8XXA MUSCLE STRAIN: Primary | ICD-10-CM

## 2018-06-21 PROCEDURE — 96372 THER/PROPH/DIAG INJ SC/IM: CPT

## 2018-06-21 PROCEDURE — 99283 EMERGENCY DEPT VISIT LOW MDM: CPT | Mod: 25

## 2018-06-21 RX ORDER — ORPHENADRINE CITRATE 30 MG/ML
60 INJECTION INTRAMUSCULAR; INTRAVENOUS
Status: COMPLETED | OUTPATIENT
Start: 2018-06-21 | End: 2018-06-22

## 2018-06-21 RX ORDER — KETOROLAC TROMETHAMINE 30 MG/ML
30 INJECTION, SOLUTION INTRAMUSCULAR; INTRAVENOUS
Status: COMPLETED | OUTPATIENT
Start: 2018-06-21 | End: 2018-06-22

## 2018-06-22 VITALS
SYSTOLIC BLOOD PRESSURE: 164 MMHG | BODY MASS INDEX: 33.13 KG/M2 | HEART RATE: 59 BPM | DIASTOLIC BLOOD PRESSURE: 101 MMHG | WEIGHT: 250 LBS | RESPIRATION RATE: 18 BRPM | OXYGEN SATURATION: 98 % | HEIGHT: 73 IN | TEMPERATURE: 98 F

## 2018-06-22 PROCEDURE — 63600175 PHARM REV CODE 636 W HCPCS: Performed by: EMERGENCY MEDICINE

## 2018-06-22 RX ORDER — NAPROXEN 500 MG/1
500 TABLET ORAL EVERY 12 HOURS PRN
Qty: 20 TABLET | Refills: 0 | Status: SHIPPED | OUTPATIENT
Start: 2018-06-22

## 2018-06-22 RX ORDER — METHOCARBAMOL 500 MG/1
1000 TABLET, FILM COATED ORAL 3 TIMES DAILY
Qty: 18 TABLET | Refills: 0 | Status: SHIPPED | OUTPATIENT
Start: 2018-06-22 | End: 2018-06-25

## 2018-06-22 RX ADMIN — ORPHENADRINE CITRATE 60 MG: 30 INJECTION INTRAMUSCULAR; INTRAVENOUS at 12:06

## 2018-06-22 RX ADMIN — KETOROLAC TROMETHAMINE 30 MG: 30 INJECTION, SOLUTION INTRAMUSCULAR at 12:06

## 2018-06-22 NOTE — ED NOTES
Complaining of left arm pain x 5 days. Pt was seen in ED for complaint.  States patient was given prescription but caregiver never got prescription filled.

## 2018-06-22 NOTE — DISCHARGE INSTRUCTIONS
Additional instructions  Followup with your primary care physician in 1 week if you are not improving. Take all your medications as prescribed. Return to the emergency department if you have increasing pain, chest pain, difficulty breathing,  nonstop vomiting, or any other concerns. Be sure to drink plenty of fluids to stay hydrated. Get plenty of rest. Please refer to additional educational material for further instructions.

## 2018-06-22 NOTE — ED NOTES
Patient identifiers for Sonu Antunez checked and correct.  LOC: The patient is awake, alert and aware of environment with an appropriate affect, the patient is oriented x 3 and speaking appropriately.  APPEARANCE: Patient uncomfortable. and in no acute distress, patient is clean and well groomed, patient's clothing are properly fastened.  SKIN: The skin is warm and dry, patient has normal skin turgor and moist mucus membranes, skin intact, no breakdown or brusing noted.  MUSKULOSKELETAL: Patient moving all extremities, no obvious swelling or deformities noted.  RESPIRATORY: Airway is open and patent, respirations are spontaneous, patient has a normal effort and rate.

## 2018-06-22 NOTE — ED PROVIDER NOTES
Encounter Date: 6/21/2018    SCRIBE #1 NOTE: I, Ian Cast, am scribing for, and in the presence of,  Dr. Agustin. I have scribed the entire note.       History     Chief Complaint   Patient presents with    Arm Pain     arm pain from left elbow to shoulder after lifting heavy garbage bag 5 days ago. pt. was seen in ed and pain medication for muscle strain. brother is with pt. today and reports he is a [place] with caregivers in home who report the pt. rx. were never filled.  pain is wxacerbated by movement with limited rom.      CHIEF COMPLAINT: Patient presents with: left arm pain.       HISTORY OF PRESENT ILLNESS: Sonu Antunez who is a 42 y.o. presents to the emergency department today with complaint of left arm pain from elbow to shoulder after lifting a heavy garbage bag 5 days ago. His pain is exacerbated with movement of his arm, and he is also experieincing decreased ROM secondary to the pain. Patient denies any numbness, weakness, fever, or any other concerning symptoms. He was seen in the ED on 6/14, and had imaging performed with no acute findings. Patient also returned to the ED on 6/19. The patient's brother reports that his prescriptions for pain medication were never given to the patient, and he is unsure if the patient's caretakers filled the prescriptions. He has taken no OTC meds. Denies any new injury or trauma.     ALLERGIES REVIEWED  MEDICATIONS REVIEWED  PMH/PSH/SOC/FH REVIEWED     The history is provided by the patient and his brother.    Nursing/Ancillary staff note reviewed.          Review of patient's allergies indicates:  No Known Allergies  Past Medical History:   Diagnosis Date    Cellulitis     Developmental delay     Hypothyroidism 09/19/2016    URI (upper respiratory infection)      Past Surgical History:   Procedure Laterality Date    CATARACT EXTRACTION W/ INTRAOCULAR LENS IMPLANT      CHOLECYSTECTOMY       Family History   Problem Relation Age of Onset    Family  history unknown: Yes     Social History   Substance Use Topics    Smoking status: Never Smoker    Smokeless tobacco: Never Used    Alcohol use No     Review of Systems   Constitutional: Negative for activity change, chills, diaphoresis and fever.   HENT: Negative for congestion, drooling, ear pain, rhinorrhea, sneezing, sore throat and trouble swallowing.    Eyes: Negative for pain.   Respiratory: Negative for cough, chest tightness, shortness of breath, wheezing and stridor.    Cardiovascular: Negative for chest pain, palpitations and leg swelling.   Gastrointestinal: Negative for abdominal distention, abdominal pain, constipation, diarrhea, nausea and vomiting.   Genitourinary: Negative for difficulty urinating, dysuria, frequency and urgency.   Musculoskeletal: Positive for arthralgias and myalgias. Negative for back pain, neck pain and neck stiffness.        Right arm pain from elbow to shoulder   Skin: Negative for pallor, rash and wound.   Neurological: Negative for dizziness, syncope, weakness, light-headedness, numbness and headaches.   All other systems reviewed and are negative.      Physical Exam     Initial Vitals [06/21/18 2259]   BP Pulse Resp Temp SpO2   (!) 153/91 77 20 98 °F (36.7 °C) 98 %      MAP       --         Physical Exam    Nursing note and vitals reviewed.  Constitutional: He appears well-developed and well-nourished. He is not diaphoretic. No distress.   HENT:   Head: Normocephalic and atraumatic.   Nose: Nose normal.   Mouth/Throat: Oropharynx is clear and moist.   Eyes: Conjunctivae and EOM are normal. Pupils are equal, round, and reactive to light. No scleral icterus.   Neck: Normal range of motion. Neck supple. No JVD present.   Cardiovascular: Normal rate, regular rhythm and normal heart sounds. Exam reveals no gallop and no friction rub.    No murmur heard.  Pulmonary/Chest: Breath sounds normal. No stridor. No respiratory distress. He has no wheezes. He exhibits no tenderness.    Abdominal: Soft. Bowel sounds are normal. He exhibits no distension and no mass. There is no tenderness. There is no rebound and no guarding.   Musculoskeletal: He exhibits no edema.        Cervical back: Normal.        Thoracic back: Normal.        Lumbar back: Normal.        Arms:  Lymphadenopathy:     He has no cervical adenopathy.   Neurological: He is alert and oriented to person, place, and time. He has normal strength. No cranial nerve deficit.   Skin: Skin is warm and dry. No rash noted. No pallor.   Psychiatric: He has a normal mood and affect. Thought content normal.         ED Course   Procedures  Labs Reviewed - No data to display          Medical Decision Making:   History:   Old Medical Records: I decided to obtain old medical records.  Initial Assessment:   This is a 42 y.o. presents to the emergency department today with complaint of left arm pain from elbow to shoulder after lifting a heavy garbage bag 5 days ago. The patient had imaging performed on 6/14 which showed no acute findings. I believe this is musculoskeletal pain, and will prescribe antiinflammatories and muscle relaxant.  ED Management:  00:40  Patient's pain is improved following medications. Will write prescription for Naprosyn and Robaxin. Patient's brother is at bedside and reports he will go get them filled immediately so we do not have to be concerned whether or not the state will refill his prescription. Patient stable for discharge at this time. After taking into careful account the historical factors and physical exam findings of the patient's presentation today, in conjunction with the empirical and objective data obtained on ED workup, no acute emergent medical condition requiring admission has been identified. The patient appears to be low risk for an emergent medical condition and I feel it is safe and appropriate at this time for the patient to be discharged to follow-up as detailed in their discharge instructions for  reevaluation and possible continued outpatient workup and management. I have discussed the specifics of the workup with the patient and the patient has verbalized understanding of the details of the workup, the diagnosis, the treatment plan, and the need for outpatient follow-up.  Although the patient has no emergent etiology today this does not preclude the development of an emergent condition so in addition, I have advised the patient that they can return to the ED and/or activate EMS at any time with worsening of their symptoms, change of their symptoms, or with any other medical complaint.  The patient remained comfortable and stable during their visit in the ED.  Discharge and follow-up instructions discussed with the patient who expressed understanding and willingness to comply with my recommendations.                      Clinical Impression:     1. Muscle strain        Disposition:   Disposition: Discharged  Condition: Stable               Teri Agustin MD  06/22/18 0306

## 2018-12-05 ENCOUNTER — HOSPITAL ENCOUNTER (EMERGENCY)
Facility: HOSPITAL | Age: 43
Discharge: HOME | End: 2018-12-05
Attending: EMERGENCY MEDICINE
Payer: MEDICARE

## 2018-12-05 VITALS
BODY MASS INDEX: 34.46 KG/M2 | SYSTOLIC BLOOD PRESSURE: 164 MMHG | TEMPERATURE: 98 F | OXYGEN SATURATION: 98 % | HEART RATE: 90 BPM | WEIGHT: 260 LBS | DIASTOLIC BLOOD PRESSURE: 72 MMHG | HEIGHT: 73 IN | RESPIRATION RATE: 16 BRPM

## 2018-12-05 DIAGNOSIS — J06.9 UPPER RESPIRATORY TRACT INFECTION, UNSPECIFIED TYPE: Primary | ICD-10-CM

## 2018-12-05 PROCEDURE — 99282 EMERGENCY DEPT VISIT SF MDM: CPT

## 2018-12-05 PROCEDURE — 99284 EMERGENCY DEPT VISIT MOD MDM: CPT | Mod: ,,, | Performed by: EMERGENCY MEDICINE

## 2018-12-05 NOTE — ED NOTES
Pt's first and last name and birthday confirmed.   LOC: The patient is awake, alert and aware of environment with an appropriate affect, the patient is oriented x 3 and speaking appropriately.  APPEARANCE: Patient resting comfortably and in no acute distress, patient is clean and well groomed.  SKIN: The skin is warm and dry, patient has normal skin turgor and moist mucus membranes, skin intact, no breakdown or brusing noted.  MUSKULOSKELETAL: Patient moving all extremities well, no obvious swelling or deformities noted.  RESPIRATORY: Airway is open and patent, respirations are spontaneous, patient has a normal effort and rate. Breath sounds are clear and equal bilaterally. Pt reports non productive cough.  CARDIAC: Normal heart sounds. No peripheral edema.  ABDOMEN: Soft and non tender to palpation, no distention noted. Bowel sounds present.   NEURO: No neuro deficits, hand grasp equal, no drift noted, no facial droop noted. Speech is clear.

## 2018-12-05 NOTE — ED PROVIDER NOTES
"Encounter Date: 12/5/2018    SCRIBE #1 NOTE: I, Son Bree, am scribing for, and in the presence of,  Dr. Rubin . I have scribed the following portions of the note - Other sections scribed: HPI ROS PE .       History     Chief Complaint   Patient presents with    Cough     started Saturday with a tickle in his throat, + headache; "think I have the flu"      Time patient was seen by the provider: 1:14 PM      The patient is a 43 y.o. male with co-morbidities including: cellulitis, URI,  who presents to the ED with a complaint of cough associated with shortness of breath that started 3 days ago . Endorses sore throat and mild wheezing. Denies fever.       The history is provided by the patient and medical records.   Cough   This is a new problem. The current episode started several days ago. The problem has been gradually worsening. There has been no fever. Associated symptoms include sore throat, myalgias and shortness of breath.     Review of patient's allergies indicates:  No Known Allergies  Past Medical History:   Diagnosis Date    Cellulitis     Developmental delay     Hypothyroidism 09/19/2016    URI (upper respiratory infection)      Past Surgical History:   Procedure Laterality Date    CATARACT EXTRACTION W/ INTRAOCULAR LENS IMPLANT      CHOLECYSTECTOMY       Family History   Family history unknown: Yes     Social History     Tobacco Use    Smoking status: Never Smoker    Smokeless tobacco: Never Used   Substance Use Topics    Alcohol use: No    Drug use: No     Review of Systems   Constitutional: Positive for fatigue.   HENT: Positive for sore throat.    Eyes: Negative.    Respiratory: Positive for cough and shortness of breath.    Cardiovascular: Negative.    Gastrointestinal: Negative.    Genitourinary: Negative.    Musculoskeletal: Positive for myalgias.   Skin: Negative.    Neurological: Negative.    All other systems reviewed and are negative.      Physical Exam     Initial Vitals [12/05/18 " 1301]   BP Pulse Resp Temp SpO2   (!) 172/89 (!) 112 20 98.3 °F (36.8 °C) 98 %      MAP       --         Physical Exam    Nursing note and vitals reviewed.  Constitutional: He appears well-developed and well-nourished. No distress.   HENT:   Head: Normocephalic.   Erythema in posterior throat, no exudate    Eyes: Pupils are equal, round, and reactive to light.   Neck: Normal range of motion.   Cardiovascular: Normal rate, regular rhythm and normal heart sounds.   Pulmonary/Chest: Breath sounds normal.   Abdominal: Soft.   Musculoskeletal: Normal range of motion.   Neurological: He is alert and oriented to person, place, and time. He has normal strength.   Skin: Skin is warm.   Psychiatric: He has a normal mood and affect.         ED Course   Procedures  Labs Reviewed - No data to display       Imaging Results    None          Medical Decision Making:   History:   Old Medical Records: I decided to obtain old medical records.  Initial Assessment:   Presents with a cough since Saturday.  Differential Diagnosis:   Differential diagnosis includes but is not limited to viral upper respiratory illness, strep throat  ED Management:  Patient was seen and examined.  Will be discharged home.              Attending Attestation:           Physician Attestation for Scribe:      Comments: I, Dr. Rubin, personally performed the services described in this documentation. All medical record entries made by the scribe were at my direction and in my presence.  I have reviewed the chart and agree that the record reflects my personal performance and is accurate and complete. Eric Rubin DO  9:44 AM 12/18/2018                 Clinical Impression:   The encounter diagnosis was Upper respiratory tract infection, unspecified type.      Disposition:   Disposition: Discharged  Condition: Stable                        Eric Rubin DO  12/18/18 0945

## 2018-12-05 NOTE — ED TRIAGE NOTES
Pt with cough, congestion and sore throat since Sunday.  Pt states that he needs a paper to go back to work.  Pt states that he has had clear phlegm.  Symptoms resolving.

## 2019-12-29 VITALS
BODY MASS INDEX: 33.13 KG/M2 | OXYGEN SATURATION: 96 % | SYSTOLIC BLOOD PRESSURE: 141 MMHG | DIASTOLIC BLOOD PRESSURE: 92 MMHG | HEART RATE: 80 BPM | RESPIRATION RATE: 16 BRPM | HEIGHT: 73 IN | WEIGHT: 250 LBS | TEMPERATURE: 99 F

## 2019-12-29 PROCEDURE — 99283 EMERGENCY DEPT VISIT LOW MDM: CPT

## 2019-12-29 PROCEDURE — 99284 EMERGENCY DEPT VISIT MOD MDM: CPT | Mod: ,,, | Performed by: PHYSICIAN ASSISTANT

## 2019-12-29 PROCEDURE — 99284 PR EMERGENCY DEPT VISIT,LEVEL IV: ICD-10-PCS | Mod: ,,, | Performed by: PHYSICIAN ASSISTANT

## 2019-12-30 ENCOUNTER — HOSPITAL ENCOUNTER (EMERGENCY)
Facility: HOSPITAL | Age: 44
Discharge: HOME | End: 2019-12-30
Attending: EMERGENCY MEDICINE
Payer: MEDICARE

## 2019-12-30 DIAGNOSIS — M79.641 RIGHT HAND PAIN: Primary | ICD-10-CM

## 2019-12-30 PROCEDURE — 25000003 PHARM REV CODE 250: Performed by: PHYSICIAN ASSISTANT

## 2019-12-30 RX ORDER — ACETAMINOPHEN 500 MG
1000 TABLET ORAL
Status: COMPLETED | OUTPATIENT
Start: 2019-12-30 | End: 2019-12-30

## 2019-12-30 RX ORDER — NAPROXEN 500 MG/1
500 TABLET ORAL 2 TIMES DAILY WITH MEALS
Qty: 14 TABLET | Refills: 0 | Status: SHIPPED | OUTPATIENT
Start: 2019-12-30 | End: 2020-01-06

## 2019-12-30 RX ADMIN — ACETAMINOPHEN 1000 MG: 500 TABLET ORAL at 12:12

## 2019-12-30 NOTE — ED TRIAGE NOTES
"Sonu Antunez, a 44 y.o. male presents to the ED w/ complaint of right hand pain x1 week. 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 Describes pain as "aching". Rates pain 9 on 0-10 scale. Pt wit moderate ROM to right hand. States pain worsens with movement. Reports tylenol for pain with no relief.     Triage note:  Chief Complaint   Patient presents with    Hand Pain     Pt c/o right hand pain and having trouble making a fist since 12/25. 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      Review of patient's allergies indicates:  No Known Allergies  Past Medical History:   Diagnosis Date    Cellulitis     Developmental delay     Hypothyroidism 09/19/2016    URI (upper respiratory infection)      "

## 2019-12-30 NOTE — ED PROVIDER NOTES
Encounter Date: 12/29/2019       History     Chief Complaint   Patient presents with    Hand Pain     Pt c/o right hand pain and having trouble making a fist since 12/25. 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      Patient is a 44 year old male with PMHx of developmental delay and hypothyroidism. He presents to the ED for hand pain. He reports having right hand pain for approximately four days. Describes pain as constant and aching. Rates pain 10/10. Reports associated swelling. Denies recent falls or trauma. He denies fever,chills, nausea, vomiting, sob, chest pain, abd pain, dysuria, diarrhea, or constipation. He is a non smoker and denies alcohol use.    The history is provided by the patient and medical records. No  was used.     Review of patient's allergies indicates:  No Known Allergies  Past Medical History:   Diagnosis Date    Cellulitis     Developmental delay     Hypothyroidism 09/19/2016    URI (upper respiratory infection)      Past Surgical History:   Procedure Laterality Date    CATARACT EXTRACTION W/ INTRAOCULAR LENS IMPLANT      CHOLECYSTECTOMY       Family History   Family history unknown: Yes     Social History     Tobacco Use    Smoking status: Never Smoker    Smokeless tobacco: Never Used   Substance Use Topics    Alcohol use: No    Drug use: No     Review of Systems   Constitutional: Negative for fever.   HENT: Negative for sore throat.    Respiratory: Negative for shortness of breath.    Cardiovascular: Negative for chest pain.   Gastrointestinal: Negative for abdominal pain, nausea and vomiting.   Genitourinary: Negative for dysuria.   Musculoskeletal: Positive for arthralgias and joint swelling. Negative for back pain.   Skin: Negative for rash.   Neurological: Negative for weakness.   Hematological: Does not bruise/bleed easily.       Physical Exam     Initial Vitals [12/29/19 2341]   BP Pulse Resp Temp SpO2   (!) 141/92 80 16 98.7 °F (37.1 °C) 96 %      MAP        --         Physical Exam    Vitals reviewed.  Constitutional: He appears well-developed and well-nourished. He is Obese . No distress.   HENT:   Head: Normocephalic.   Eyes: Conjunctivae are normal.   Neck: Normal range of motion.   Cardiovascular: Normal rate and regular rhythm.   No murmur heard.  Pulses:       Radial pulses are 2+ on the right side, and 2+ on the left side.   Pulmonary/Chest: Breath sounds normal. No respiratory distress. He has no wheezes. He has no rales.   Musculoskeletal: Normal range of motion.        Right hand: He exhibits swelling. He exhibits normal range of motion, no tenderness, no bony tenderness, normal capillary refill and no deformity. Normal sensation noted. Normal strength noted.   Neurological: He is alert and oriented to person, place, and time. He has normal strength. No sensory deficit.   Skin: Skin is warm and dry. No erythema.         ED Course   Procedures  Labs Reviewed - No data to display       Imaging Results          X-Ray Hand 3 View Right (Final result)  Result time 12/30/19 01:01:30    Final result by Leonel Cronin MD (12/30/19 01:01:30)                 Impression:      No acute bony abnormality.      Electronically signed by: Leonel Cronin MD  Date:    12/30/2019  Time:    01:01             Narrative:    EXAMINATION:  XR HAND COMPLETE 3 VIEW RIGHT    CLINICAL HISTORY:  right hand pain;    TECHNIQUE:  Three views of the right hand.    COMPARISON:  None.    FINDINGS:  Lower carpal row is unfortunately not well evaluated secondary to suboptimal positioning.  Dedicated scaphoid views could be performed if the patient has snuffbox tenderness.  No evidence of acute fracture or dislocation.  Soft tissues are prominent but symmetric.  No radiopaque foreign body.                                 Medical Decision Making:   History:   Old Medical Records: I decided to obtain old medical records.  Clinical Tests:   Radiological Study: Ordered and Reviewed       APC /  Resident Notes:   Patient is a 44 year old male presents to the ED for emergent evaluation of right hand pain.     Will order imaging. Will order pain medication for symptomatic relief. Will continue to monitor.     Differential diagnoses include, but are not limited to: fracture, dislocation, neuropathy, or contusion.     Xray found to have No acute bony abnormality.    Patient reassessed, reports symptoms improved with ED management. I have discussed emergency department findings, and plan with the patient. Will discharge home with naprosyn and F/U with PCP. Patient verbalizes understanding of plan and agrees. Return precautions given.     I have discussed and reviewed with my supervising physician.         Attending Attestation:     Physician Attestation Statement for NP/PA:   I discussed this assessment and plan of this patient with the NP/PA, but I did not personally examine the patient. The face to face encounter was performed by the NP/PA.              Clinical Impression:       ICD-10-CM ICD-9-CM   1. Right hand pain M79.641 729.5         Disposition:   Disposition: Discharged  Condition: Stable                     Magui Camacho PA-C  12/30/19 0928       Chaka Best Jr., MD  12/31/19 0489

## 2020-02-17 ENCOUNTER — PATIENT OUTREACH (OUTPATIENT)
Dept: EMERGENCY MEDICINE | Facility: HOSPITAL | Age: 45
End: 2020-02-17

## 2020-02-17 ENCOUNTER — HOSPITAL ENCOUNTER (EMERGENCY)
Facility: HOSPITAL | Age: 45
Discharge: HOME | End: 2020-02-17
Attending: EMERGENCY MEDICINE
Payer: MEDICARE

## 2020-02-17 VITALS
OXYGEN SATURATION: 96 % | HEIGHT: 73 IN | WEIGHT: 250 LBS | RESPIRATION RATE: 18 BRPM | TEMPERATURE: 98 F | SYSTOLIC BLOOD PRESSURE: 146 MMHG | BODY MASS INDEX: 33.13 KG/M2 | DIASTOLIC BLOOD PRESSURE: 70 MMHG | HEART RATE: 104 BPM

## 2020-02-17 DIAGNOSIS — J06.9 UPPER RESPIRATORY TRACT INFECTION, UNSPECIFIED TYPE: Primary | ICD-10-CM

## 2020-02-17 PROCEDURE — 99284 EMERGENCY DEPT VISIT MOD MDM: CPT | Mod: ,,, | Performed by: PHYSICIAN ASSISTANT

## 2020-02-17 PROCEDURE — 25000003 PHARM REV CODE 250: Performed by: PHYSICIAN ASSISTANT

## 2020-02-17 PROCEDURE — 99284 PR EMERGENCY DEPT VISIT,LEVEL IV: ICD-10-PCS | Mod: ,,, | Performed by: PHYSICIAN ASSISTANT

## 2020-02-17 PROCEDURE — 99283 EMERGENCY DEPT VISIT LOW MDM: CPT

## 2020-02-17 RX ORDER — ACETAMINOPHEN 500 MG
1000 TABLET ORAL
Status: COMPLETED | OUTPATIENT
Start: 2020-02-17 | End: 2020-02-17

## 2020-02-17 RX ADMIN — ACETAMINOPHEN 1000 MG: 500 TABLET ORAL at 11:02

## 2020-02-17 NOTE — Clinical Note
Patient denied any needs at this time. Patient states he goes to 2 local churches when he needs food bank/pantry resources.Patient was give [facility] Location information

## 2020-02-17 NOTE — ED TRIAGE NOTES
Sonu Antunez, an 44 y.o. male presents to the ED with body aches since Saturday.  Denies chest pain, shortness of breath, cough, problems with bowel or bladder.        Review of patient's allergies indicates:  No Known Allergies  Chief Complaint   Patient presents with    URI     started sat, bodyaches, need to get checked for my job     Past Medical History:   Diagnosis Date    Cellulitis     Developmental delay     Hypothyroidism 09/19/2016    URI (upper respiratory infection)

## 2020-02-17 NOTE — ED PROVIDER NOTES
Encounter Date: 2/17/2020       History     Chief Complaint   Patient presents with    URI     started sat, bodyaches, need to get checked for my job     Patient is a 44 year old male with PMHX of developmental delay and hypothyroidism. He presents to the ED for URI. Patient reports having generalized bodyaches for approximately two days. Reports associated dry non productive cough. Denies ear pain or sore throat. He denies fever,chills, nausea, vomiting, sob, chest pain, abd pain, dysuria, diarrhea, or constipation. He is a non smoker and denies alcohol use.    The history is provided by the patient and medical records. No  was used.     Review of patient's allergies indicates:  No Known Allergies  Past Medical History:   Diagnosis Date    Cellulitis     Developmental delay     Hypothyroidism 09/19/2016    URI (upper respiratory infection)      Past Surgical History:   Procedure Laterality Date    CATARACT EXTRACTION W/ INTRAOCULAR LENS IMPLANT      CHOLECYSTECTOMY       Family History   Family history unknown: Yes     Social History     Tobacco Use    Smoking status: Never Smoker    Smokeless tobacco: Never Used   Substance Use Topics    Alcohol use: No    Drug use: No     Review of Systems   Constitutional: Negative for fever.   HENT: Negative for ear pain and sore throat.    Respiratory: Positive for cough. Negative for shortness of breath.    Cardiovascular: Negative for chest pain.   Gastrointestinal: Negative for abdominal pain, nausea and vomiting.   Genitourinary: Negative for dysuria.   Musculoskeletal: Positive for myalgias. Negative for back pain.   Skin: Negative for rash.   Neurological: Negative for weakness.   Hematological: Does not bruise/bleed easily.       Physical Exam     Initial Vitals [02/17/20 1053]   BP Pulse Resp Temp SpO2   (!) 146/70 104 18 98.2 °F (36.8 °C) 96 %      MAP       --         Physical Exam    Vitals reviewed.  Constitutional: He appears  well-developed and well-nourished. He is Obese . No distress.   HENT:   Head: Normocephalic.   Mouth/Throat: Uvula is midline, oropharynx is clear and moist and mucous membranes are normal. No trismus in the jaw. No uvula swelling. No oropharyngeal exudate, posterior oropharyngeal edema or posterior oropharyngeal erythema.   Eyes: Conjunctivae are normal.   Neck: Normal range of motion.   Cardiovascular: Normal rate and regular rhythm.   No murmur heard.  Pulmonary/Chest: Breath sounds normal. No respiratory distress. He has no wheezes. He has no rales.   Musculoskeletal: Normal range of motion.   Lymphadenopathy:     He has no cervical adenopathy.   Neurological: He is alert and oriented to person, place, and time.   Skin: Skin is warm and dry. No erythema.         ED Course   Procedures  Labs Reviewed - No data to display          Medical Decision Making:   History:   Old Medical Records: I decided to obtain old medical records.  Clinical Tests:   Lab Tests: Ordered and Reviewed       APC / Resident Notes:   Patient is a 44 year old male presents to the ED for emergent evaluation of URI.     Will order POCT influenza. Will order pain medication for symptomatic relief. Will continue to monitor.     Differential diagnoses include, but are not limited to: influenza, bronchitis, pharyngitis, or viral syndrome.     12:17 PM  Informed by nursing staff, patient POCT influenza negative.     Patient reassessed, reports symptoms improved with ED management. I have discussed emergency department findings, and plan with the patient. I strongly suspect that this patient's clinical picture is consistent with a viral infection.  At this point in time, there are no signs of a serious bacterial infection.  For this reason, it would not be safe or appropriate for this patient to be prescribed antibiotics as the potential harms (rash, diarrhea, allergic reaction) would greatly outweigh any potential benefits. Will discharge home  with F/U with PCP. Patient verbalizes understanding of plan and agrees. Return precautions given.     I have discussed and reviewed with my supervising physician.        Clinical Impression:       ICD-10-CM ICD-9-CM   1. Upper respiratory tract infection, unspecified type J06.9 465.9         Disposition:   Disposition: Discharged  Condition: Stable                     Magui Camacho PA-C  02/17/20 [id number]

## 2020-02-18 NOTE — TELEPHONE ENCOUNTER
I have reviewed the notes and assessments performed by Rocio Hernandez, I concur with her documentation of Sonu Antunez. I, Larisa Liz, did not personally perform the services described in this documentation.

## 2024-01-09 ENCOUNTER — HOSPITAL ENCOUNTER (EMERGENCY)
Facility: HOSPITAL | Age: 49
Discharge: HOME | End: 2024-01-09
Attending: EMERGENCY MEDICINE
Payer: MEDICARE

## 2024-01-09 VITALS
BODY MASS INDEX: 32.47 KG/M2 | HEIGHT: 73 IN | WEIGHT: 245 LBS | TEMPERATURE: 98 F | RESPIRATION RATE: 19 BRPM | DIASTOLIC BLOOD PRESSURE: 87 MMHG | HEART RATE: 87 BPM | OXYGEN SATURATION: 97 % | SYSTOLIC BLOOD PRESSURE: 166 MMHG

## 2024-01-09 DIAGNOSIS — J20.9 ACUTE BRONCHITIS, UNSPECIFIED ORGANISM: Primary | ICD-10-CM

## 2024-01-09 LAB
INFLUENZA A, MOLECULAR: NEGATIVE
INFLUENZA B, MOLECULAR: NEGATIVE
SARS-COV-2 RDRP RESP QL NAA+PROBE: NEGATIVE
SPECIMEN SOURCE: NORMAL

## 2024-01-09 PROCEDURE — 87502 INFLUENZA DNA AMP PROBE: CPT | Performed by: EMERGENCY MEDICINE

## 2024-01-09 PROCEDURE — 99284 EMERGENCY DEPT VISIT MOD MDM: CPT | Mod: 25

## 2024-01-09 PROCEDURE — U0002 COVID-19 LAB TEST NON-CDC: HCPCS | Performed by: EMERGENCY MEDICINE

## 2024-01-09 PROCEDURE — 87502 INFLUENZA DNA AMP PROBE: CPT

## 2024-01-09 NOTE — ED NOTES
"Patient states chest congestion onset Friday,  states breathing " funny" reports coughing white clear mucous, using Nilam Sanford/Mucinex. Would like work excuse.  "

## 2024-01-09 NOTE — ED PROVIDER NOTES
Source of History:  Patient    Chief complaint:  URI (States thinks got bronchitis, needing dr note to return to work note)      HPI:  Sonu Antunez is a 48 y.o. male presenting with dry nonproductive cough for the last 3-1/2 days.  Patient denies any fever or color to the sputum.  Denies any blood with his cough.  Patient is up-to-date on his flu and COVID vaccines.  He also complains of come congestion, but no earaches or sore throat.  He denies any body aches or rashes.  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    ROS: As per HPI and below:  General: No fever.  No chills.  Eyes: No visual changes.  Head: No headache.  As above.  Integument: No rashes or lesions.  Chest:  As above.  No shortness of breath.  Cardiovascular: No chest pain.  Abdomen: No abdominal pain.  No nausea or vomiting.  Urinary: No abnormal urination.      Review of patient's allergies indicates:   Allergen Reactions    Penicillins Itching       No current facility-administered medications on file prior to encounter.     Current Outpatient Medications on File Prior to Encounter   Medication Sig Dispense Refill    acetaminophen (TYLENOL) 325 MG tablet Take 650 mg by mouth every 6 (six) hours as needed for Pain.      carbamide peroxide (DEBROX) 6.5 % otic solution Place 5 drops into both ears as needed. 15 mL 0    levothyroxine (SYNTHROID) 100 MCG tablet Take 100 mcg by mouth once daily.      naproxen (NAPROSYN) 500 MG tablet Take 1 tablet (500 mg total) by mouth every 12 (twelve) hours as needed (pain and inflammation). Take with food. 20 tablet 0    tramadol (ULTRAM) 50 mg tablet Take 1 or 2 tablets every 6 hours as needed for pain.  Do not drive or operate dangerous machinery while on this medication.  MEDICATION MAY MAKE YOU MORE PRONE TO FALLING. 20 tablet 0       PMH:  As per HPI and  "below:  Past Medical History:   Diagnosis Date    Cellulitis     Developmental delay     Hypothyroidism 09/19/2016    URI (upper respiratory infection)      Past Surgical History:   Procedure Laterality Date    CATARACT EXTRACTION W/ INTRAOCULAR LENS IMPLANT      CHOLECYSTECTOMY         Social History     Socioeconomic History    Marital status: Single   Tobacco Use    Smoking status: Never    Smokeless tobacco: Never   Substance and Sexual Activity    Alcohol use: No    Drug use: No    Sexual activity: Never       Family History   Family history unknown: Yes       Physical Exam:    Vitals:    01/09/24 0920 01/09/24 1240   BP: (!) 173/96 (!) 166/87   BP Location:  Left arm   Patient Position:  Sitting   Pulse: 100 87   Resp: 18 19   Temp: 97.2 °F (36.2 °C) 97.9 °F (36.6 °C)   TempSrc: Oral Oral   SpO2: 97% 97%   Weight: 111.1 kg (245 lb)    Height: 6' 1" (1.854 m)      Appearance: No acute distress. Non productive cough present through out the exam  Skin: No rashes seen.  Good turgor.  No abrasions.  No ecchymoses.  Eyes: No conjunctival injection.  ENT: Oropharynx clear.    Chest: Clear to auscultation bilaterally.  Good air movement.  No wheezes.  No rhonchi.  Cardiovascular: Regular rate and rhythm.  No murmurs. No gallops. No rubs.  Abdomen: Soft.  Not distended.  Nontender.  No guarding.  No rebound.  Musculoskeletal: Good range of motion all joints.  No deformities.  Neck supple.  No meningismus.  Neurologic: Motor intact.  Sensation intact.  Cerebellar intact.  Cranial nerves intact.  Mental Status:  Alert and oriented x 3.  Appropriate, conversant.      Initial Impression:  Acute Bronchitis    Labs Reviewed   INFLUENZA A & B BY MOLECULAR   SARS-COV-2 RNA AMPLIFICATION, QUAL       I decided to obtain the patient's medical records.    Imaging Results              X-Ray Chest AP Portable (Final result)  Result time 01/09/24 11:23:36      Final result by Mariam Fernandez MD (01/09/24 11:23:36)              "      Impression:      No acute finding or detrimental interval change      Electronically signed by: Mariam Fernandez MD  Date:    01/09/2024  Time:    11:23               Narrative:    EXAMINATION:  XR CHEST AP PORTABLE    CLINICAL HISTORY:  cough;    TECHNIQUE:  Single frontal view of the chest was performed.    COMPARISON:  September 2017    FINDINGS:  The heart size is not enlarged.  There is no pleural effusion.  The osseous structures are intact.  The lungs are clear.                                      Medications - No data to display                MDM:    48 y.o. male with acute bronchitis.  Patient will be tested for COVID and flu as well as have a chest x-ray to try and help determine his ability to return to work.  Assuming they were all negative, I will allow the patient to return to work and advised that he wear a mask.  Tests negative.  Pt cleared to RTW and advised to wear mask until symptoms natalia.    Diagnostic Impression:    1. Acute bronchitis, unspecified organism         ED Disposition Condition    Discharge Stable          ED Prescriptions    None       Follow-up Information    None            Bradley Escobar III, MD  01/17/24 1210

## 2024-01-09 NOTE — Clinical Note
"Sonu"Maximus Antunez was seen and treated in our emergency department on 1/9/2024.  He may return to work on 01/11/2024.  Patient may return to work but advise he use a mask while symptomatic.     If you have any questions or concerns, please don't hesitate to call.      Bradley Escobar III, MD"

## 2024-01-09 NOTE — ED NOTES
Patient identifiers verified and correct for  Mr Antunez  C/C:  Congestion SEE NN  APPEARANCE: awake and alert in NAD. PAIN  5/10  SKIN: warm, dry and intact. No breakdown or bruising.  MUSCULOSKELETAL: Patient moving all extremities spontaneously, no obvious swelling or deformities noted. Ambulates independently.  RESPIRATORY: Denies shortness of breath.Respirations unlabored. Positive cough with clear sputum  CARDIAC: Denies CP, 2+ distal pulses; no peripheral edema  ABDOMEN: S/ND/NT, Denies nausea  : voids spontaneously, denies difficulty  Neurologic: AAO x 4; follows commands equal strength in all extremities; denies numbness/tingling. Denies dizziness  Denies new weakness

## 2024-02-26 ENCOUNTER — HOSPITAL ENCOUNTER (EMERGENCY)
Facility: HOSPITAL | Age: 49
Discharge: HOME | End: 2024-02-26
Attending: EMERGENCY MEDICINE
Payer: MEDICARE

## 2024-02-26 VITALS
HEART RATE: 83 BPM | BODY MASS INDEX: 31.81 KG/M2 | SYSTOLIC BLOOD PRESSURE: 124 MMHG | RESPIRATION RATE: 18 BRPM | TEMPERATURE: 98 F | WEIGHT: 240 LBS | DIASTOLIC BLOOD PRESSURE: 81 MMHG | OXYGEN SATURATION: 97 % | HEIGHT: 73 IN

## 2024-02-26 DIAGNOSIS — B07.0 PLANTAR WART, LEFT FOOT: Primary | ICD-10-CM

## 2024-02-26 PROCEDURE — 99284 EMERGENCY DEPT VISIT MOD MDM: CPT

## 2024-02-26 NOTE — Clinical Note
"Sonu"Maximus Antunez was seen and treated in our emergency department on 2/26/2024.  He may return to work on 02/27/2024.  Can return to work with no restrictions     If you have any questions or concerns, please don't hesitate to call.      Yeimy Galan MD"

## 2024-02-26 NOTE — FIRST PROVIDER EVALUATION
"Medical screening examination initiated.  I have conducted a focused provider triage encounter, findings are as follows:    Brief history of present illness:  Left foot pain.  Has area of swelling    Vitals:    02/26/24 1622   BP: (!) 142/85   Pulse: 100   Resp: 18   Temp: 98.4 °F (36.9 °C)   TempSrc: Oral   SpO2: 96%   Weight: 108.9 kg (240 lb)   Height: 6' 1" (1.854 m)       Pertinent physical exam:  lesion that looks like a possible plantar wart    Brief workup plan:  to Intake    Preliminary workup initiated; this workup will be continued and followed by the physician or advanced practice provider that is assigned to the patient when roomed.  "

## 2024-02-27 NOTE — DISCHARGE INSTRUCTIONS
You can purchase over-the-counter wart pads to put on your foot before you put on your tennis shoe.  This may be helpful when you go to work.  I have put a referral in for you to follow-up with the foot doctor which is called a podiatrist.  They can help take care of this.

## 2024-02-27 NOTE — ED PROVIDER NOTES
Encounter Date: 2/26/2024       History     Chief Complaint   Patient presents with    Foot Pain     L foot pain has a bump, denies injury     Sonu Antunez is a 48-year-old male with past medical history of developmental delay, hypothyroidism presenting today with a painful bump on the bottom of his left foot.  He noticed it several days ago.  He has been going to work with no issues.  Today the pain became worse in his work told him to come to be evaluated.  He denies stepping on anything denies injury.  He typically wears tennis shoes when he goes to work.  No fevers or chills.  He has not tried anything for this      Review of patient's allergies indicates:   Allergen Reactions    Penicillins Itching     Past Medical History:   Diagnosis Date    Cellulitis     Developmental delay     Hypothyroidism 09/19/2016    URI (upper respiratory infection)      Past Surgical History:   Procedure Laterality Date    CATARACT EXTRACTION W/ INTRAOCULAR LENS IMPLANT      CHOLECYSTECTOMY       Family History   Family history unknown: Yes     Social History     Tobacco Use    Smoking status: Never    Smokeless tobacco: Never   Substance Use Topics    Alcohol use: No    Drug use: No     Review of Systems    Physical Exam     Initial Vitals [02/26/24 1622]   BP Pulse Resp Temp SpO2   (!) 142/85 100 18 98.4 °F (36.9 °C) 96 %      MAP       --         Physical Exam    Nursing note and vitals reviewed.  Constitutional: He appears well-developed and well-nourished. No distress.   Cardiovascular:  Normal rate and intact distal pulses.             Neurological: He is alert and oriented to person, place, and time.   Skin:   Small plantar wart on the sole of the left foot w/o erythema or fluctuance         ED Course   ED US LTD Soft Tissue/Skin    Date/Time: 2/26/2024 7:40 PM    Performed by: Yeimy Galan MD  Authorized by: Yeimy Galan MD    Procedure:  Focused Soft Tissue Ultrasound Exam (no foregin body or fluid collection)  Charge?:   Yes    Labs Reviewed - No data to display       Imaging Results    None          Medications - No data to display  Medical Decision Making  Emergent evaluation a 48-year-old male here with a painful bump on the bottom of his foot, exam consistent with a plantar wart.  Bedside ultrasound performed, no foreign body or abscess.  Recommended over-the-counter wart pads to place prior to putting on his tennis shoe.  It is okay for him to return to work.  I have sent a referral to Podiatry for further outpatient management.  Return precautions given.                                      Clinical Impression:  Final diagnoses:  [B07.0] Plantar wart, left foot (Primary)          ED Disposition Condition    Discharge Stable          ED Prescriptions    None       Follow-up Information       Follow up With Specialties Details Why Contact Info Additional Information    [place] [facility] Podiatry Call   [address]  [address]  [facility] [address] [phone] Please park in [address] or [address] and use [address]anna ball. Take Medical Office Bldg elevators.    [id number] Call   [address] [address] [phone] [facility] - Main Building, 5th Floor Please [facility] in [facility] and use [address] elevator    [place] - Podiatry Podiatry Call   [address]  [address]  [facility] [address] [phone] [address]             Yeimy Galan MD  02/26/24 1198

## 2024-04-03 ENCOUNTER — OFFICE VISIT (OUTPATIENT)
Dept: PODIATRY | Facility: CLINIC | Age: 49
End: 2024-04-03
Payer: MEDICARE

## 2024-04-03 VITALS
HEART RATE: 90 BPM | WEIGHT: 247.13 LBS | BODY MASS INDEX: 32.75 KG/M2 | HEIGHT: 73 IN | SYSTOLIC BLOOD PRESSURE: 137 MMHG | DIASTOLIC BLOOD PRESSURE: 92 MMHG

## 2024-04-03 DIAGNOSIS — Q82.8 POROKERATOSIS: Primary | ICD-10-CM

## 2024-04-03 PROCEDURE — 17110 DESTRUCTION B9 LES UP TO 14: CPT | Mod: S$GLB,,, | Performed by: PODIATRIST

## 2024-04-03 PROCEDURE — 3075F SYST BP GE 130 - 139MM HG: CPT | Mod: CPTII,S$GLB,, | Performed by: PODIATRIST

## 2024-04-03 PROCEDURE — 3008F BODY MASS INDEX DOCD: CPT | Mod: CPTII,S$GLB,, | Performed by: PODIATRIST

## 2024-04-03 PROCEDURE — 99203 OFFICE O/P NEW LOW 30 MIN: CPT | Mod: 25,S$GLB,, | Performed by: PODIATRIST

## 2024-04-03 PROCEDURE — 99999 PR PBB SHADOW E&M-EST. PATIENT-LVL III: CPT | Mod: PBBFAC,,, | Performed by: PODIATRIST

## 2024-04-03 PROCEDURE — 3080F DIAST BP >= 90 MM HG: CPT | Mod: CPTII,S$GLB,, | Performed by: PODIATRIST

## 2024-04-03 NOTE — PROGRESS NOTES
Subjective:      Patient ID: Sonu Antunez is a 48 y.o. male.    Chief Complaint: Plantar Warts    Sonu is a 48 y.o. male who presents to the podiatry clinic  with complaint of  left foot pain. Onset of the symptoms was several weeks ago. Precipitating event: none known. Current symptoms include: ability to bear weight, but with some pain. Aggravating factors: any weight bearing. Symptoms have gradually worsened. Patient has had no prior foot problems. Evaluation to date: none. Treatment to date: none. Patients rates pain 5/10 on pain scale.    Review of Systems   Constitutional: Negative for chills, fever and malaise/fatigue.   HENT:  Negative for hearing loss.    Cardiovascular:  Negative for claudication.   Respiratory:  Negative for shortness of breath.    Skin:  Negative for flushing and rash.   Musculoskeletal:  Negative for joint pain and myalgias.   Neurological:  Negative for loss of balance, numbness, paresthesias and sensory change.   Psychiatric/Behavioral:  Negative for altered mental status.            Objective:      Physical Exam  Vitals reviewed.   Cardiovascular:      Pulses:           Dorsalis pedis pulses are 2+ on the right side and 2+ on the left side.        Posterior tibial pulses are 2+ on the right side and 2+ on the left side.      Comments: No edema noted b/L  Musculoskeletal:      Comments:        Feet:      Right foot:      Protective Sensation: 5 sites tested.  5 sites sensed.      Left foot:      Protective Sensation: 5 sites tested.  5 sites sensed.   Skin:     General: Skin is warm.      Capillary Refill: Capillary refill takes 2 to 3 seconds.      Comments: Normal skin tugor noted.   No open lesion noted b/L  Skin temp is warm to warm from proximal to distal b/L.  Webspaces clean, dry, and intact  IPK noted to plantar left foot   No divergent skin lines noted   Neurological:      Mental Status: He is alert.      Comments: Gross sensation intact b/L               Assessment:        Encounter Diagnosis   Name Primary?    Porokeratosis - Left Foot Yes         Plan:       Sonu was seen today for plantar warts.    Diagnoses and all orders for this visit:    Porokeratosis - Left Foot  -     Ambulatory referral/consult to Podiatry      I counseled the patient on his conditions, their implications and medical management.    Pt advised that pain is likely due to porokeratosis.   Porokeratosis is a skin condition that appears in adulthood in the form of many tiny, ridge-like bumps on the palms of the hands and soles of the feet.   Using a #3 curette, the lesion was debrided and core removed without incident. Silver nitrate applied to center.   Pt voiced relief, horse shoe pad placed around lesion(s) to offload.   Call or return to clinic prn if these symptoms worsen or fail to improve as anticipated.      .

## 2024-05-10 ENCOUNTER — HOSPITAL ENCOUNTER (EMERGENCY)
Facility: HOSPITAL | Age: 49
Discharge: HOME | End: 2024-05-10
Attending: EMERGENCY MEDICINE
Payer: MEDICARE

## 2024-05-10 VITALS
DIASTOLIC BLOOD PRESSURE: 94 MMHG | BODY MASS INDEX: 32.87 KG/M2 | SYSTOLIC BLOOD PRESSURE: 153 MMHG | HEIGHT: 73 IN | RESPIRATION RATE: 20 BRPM | TEMPERATURE: 98 F | WEIGHT: 248 LBS | OXYGEN SATURATION: 98 % | HEART RATE: 69 BPM

## 2024-05-10 DIAGNOSIS — N20.0 KIDNEY STONE: ICD-10-CM
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
## 2024-05-10 DIAGNOSIS — S39.012A LUMBAR STRAIN, INITIAL ENCOUNTER: ICD-10-CM

## 2024-05-10 PROCEDURE — 25000003 PHARM REV CODE 250: Performed by: EMERGENCY MEDICINE

## 2024-05-10 PROCEDURE — 99285 EMERGENCY DEPT VISIT HI MDM: CPT | Mod: 25

## 2024-05-10 RX ORDER — IBUPROFEN 600 MG/1
600 TABLET ORAL EVERY 6 HOURS PRN
Qty: 20 TABLET | Refills: 0 | Status: SHIPPED | OUTPATIENT
Start: 2024-05-10

## 2024-05-10 RX ORDER — LIDOCAINE 50 MG/G
1 PATCH TOPICAL DAILY
Qty: 9 PATCH | Refills: 0 | Status: SHIPPED | OUTPATIENT
Start: 2024-05-10

## 2024-05-10 RX ORDER — IBUPROFEN 600 MG/1
600 TABLET ORAL
Status: COMPLETED | OUTPATIENT
Start: 2024-05-10 | End: 2024-05-10

## 2024-05-10 RX ORDER — LIDOCAINE 50 MG/G
1 PATCH TOPICAL
Status: DISCONTINUED | OUTPATIENT
Start: 2024-05-10 | End: 2024-05-10 | Stop reason: HOSPADM

## 2024-05-10 RX ADMIN — LIDOCAINE 1 PATCH: 50 PATCH CUTANEOUS at 09:05

## 2024-05-10 RX ADMIN — IBUPROFEN 600 MG: 600 TABLET, FILM COATED ORAL at 09:05

## 2024-05-10 NOTE — ED TRIAGE NOTES
Sonu Antunez, a 48 y.o. male presents to the ED w/ complaint of trip/fall yesterday with pain in lower back    Triage note:  Chief Complaint   Patient presents with    Fall     Tripped and fell yest , pain to L flank     Review of patient's allergies indicates:   Allergen Reactions    Penicillins Itching     Past Medical History:   Diagnosis Date    Cellulitis     Developmental delay     Hypothyroidism 09/19/2016    URI (upper respiratory infection)          APPEARANCE: awake and alert in NAD. PAIN  8/10  SKIN: warm, dry and intact. No breakdown or bruising.  MUSCULOSKELETAL: Patient moving all extremities spontaneously, no obvious swelling or deformities noted. Ambulates independently.  RESPIRATORY: Denies shortness of breath.Respirations unlabored.   CARDIAC: Denies CP, 2+ distal pulses; no peripheral edema  ABDOMEN: S/ND/NT, Denies nausea  : voids spontaneously, denies difficulty  Neurologic: AAO x 4; follows commands equal strength in all extremities; denies numbness/tingling. Denies dizziness

## 2024-05-10 NOTE — ED PROVIDER NOTES
"Encounter Date: 5/10/2024       History     Chief Complaint   Patient presents with    Fall     Tripped and fell yest , pain to L flank     49 yo M with pmhx hypothyroidism, developmental delay presents with a chief complaint of back pain status post trip and fall.  Patient was working on grass yesterday when his foot got caught into a hole in the ground and he fell onto his bottom.  He notes since that time he has had pain at his right lower back.  He did not hit his lower back it just was pain after the fall.  Pain has been constant.  He tried an over-the-counter "bath and body ache" from [employer] with out relief of symptoms.  He tried Epsom salts bath with some relief.  He denies any weakness, numbness, bowel or bladder incontinence. No anticoagulants. Triage note clarified: no flank pain. No abdominal pain, dysuria. No head trauma or LOC.    The history is provided by the patient.     Review of patient's allergies indicates:   Allergen Reactions    Penicillins Itching     Past Medical History:   Diagnosis Date    Cellulitis     Developmental delay     Hypothyroidism 09/19/2016    URI (upper respiratory infection)      Past Surgical History:   Procedure Laterality Date    CATARACT EXTRACTION W/ INTRAOCULAR LENS IMPLANT      CHOLECYSTECTOMY       Family History   Family history unknown: Yes     Social History     Tobacco Use    Smoking status: Never    Smokeless tobacco: Never   Substance Use Topics    Alcohol use: No    Drug use: No     Review of Systems    Physical Exam     Initial Vitals [05/10/24 0824]   BP Pulse Resp Temp SpO2   139/89 84 20 97.7 °F (36.5 °C) 98 %      MAP       --         Physical Exam    Nursing note and vitals reviewed.  Constitutional: He appears well-developed and well-nourished. He is not diaphoretic. No distress.   HENT:   Head: Normocephalic and atraumatic.   Eyes: Right eye exhibits no discharge. Left eye exhibits no discharge. No scleral icterus.   Neck:   Normal range of " motion.  Cardiovascular:  Normal rate.           Pulmonary/Chest: No stridor. No respiratory distress.   Abdominal: There is no abdominal tenderness.   No right CVA tenderness.  No left CVA tenderness.   Musculoskeletal:      Cervical back: Normal range of motion. No bony tenderness.      Thoracic back: No bony tenderness.      Lumbar back: Tenderness (most pronounced along right lateral upper lumbar spine) and bony tenderness (upper lumbar, no stepoffs) present.     Neurological: He is alert and oriented to person, place, and time. He has normal strength. No sensory deficit.   Skin: Skin is warm.   Psychiatric: Thought content normal.         ED Course   Procedures  Labs Reviewed - No data to display         Imaging Results              CT Renal Stone Study ABD Pelvis WO (Final result)  Result time 05/10/24 11:38:34      Final result by Nelson Carson MD (05/10/24 11:38:34)                   Impression:      8 mm nonobstructing right renal calculus.  No hydroureteronephrosis.    Hepatosplenomegaly.    Suspected focal area of atelectasis in the inferior lingula with small 5 mm pulmonary nodule (less likely) not excluded.  Consider follow-up chest CT in 1 year if patient is high risk.    Additional findings in the body of the report.    Electronically signed by resident: Mauricio Silva  Date:    05/10/2024  Time:    11:20    Electronically signed by: Nelson Carson  Date:    05/10/2024  Time:    11:38               Narrative:    EXAMINATION:  CT RENAL STONE STUDY ABD PELVIS WO    CLINICAL HISTORY:  Flank pain, kidney stone suspected;R flank pain s/p fall; calcification on x-ray;    TECHNIQUE:  Low dose axial images, sagittal and coronal reformations were obtained from the lung bases to the pubic symphysis.  Contrast was not administered.    COMPARISON:  Lumbar radiograph 05/10/2024.    FINDINGS:  Lower chest: Heart size is normal. 5 mm nodular-like opacity in the inferior lingula (axial series 2, image 19) perhaps  reflecting focal area of atelectasis versus less likely pulmonary nodule.  Mild dependent atelectasis of the lung bases.  No focal consolidation.    Abdomen:    Evaluation of the solid abdominal organs and bowel is limited in the absence of IV contrast.    Liver is enlarged and steatotic.  No focal hepatic lesions.  Gallbladder surgically absent.  No intra-or extrahepatic biliary ductal dilatation.    Spleen is enlarged at 15 cm.  No discrete adrenal nodules.  Mild atrophy of the pancreas.    Kidneys are symmetric.  3.1 cm left renal cyst.  8 mm right renal calculus.  Other punctate 1-2 mm right renal calculus (axial series 2, image 89).  No hydroureteronephrosis.    No distended loops of small bowel. Normal appendix.  Stool in the colon.    Abdominal aorta is normal in course and caliber.    No abdominal lymphadenopathy.    No abdominal free fluid or pneumoperitoneum.    Pelvis: No urinary bladder wall thickening.  Prostate is normal in size.  No free fluid in the pelvis.  No pelvic adenopathy.    Bones and soft tissues: No aggressive osseous lesions. Mild degenerative changes at L5-S1. Small bilateral fat containing inguinal hernias.                                       X-Ray Lumbar Spine Ap And Lateral (Final result)  Result time 05/10/24 10:39:46      Final result by Atul Downey Jr., MD (05/10/24 10:39:46)                   Impression:      No convincing abnormality though the bones are not optimally visualized on the lateral projection.      Electronically signed by: Atul Downey MD  Date:    05/10/2024  Time:    10:39               Narrative:    EXAMINATION:  XR LUMBAR SPINE AP AND LATERAL    CLINICAL HISTORY:  fall;    TECHNIQUE:  AP, lateral and spot images were performed of the lumbar spine.    COMPARISON:  None    FINDINGS:  8 mm calcification just lateral to the right L4 transverse process uncertain location and significance.  Significant amount of scattered stool and bowel gas.  No convincing  compression fractures.  Alignment appears satisfactory.  Facets not well visualized likely due to technique.  Correlation advised.                                       Medications   LIDOcaine 5 % patch 1 patch (1 patch Transdermal Patch Applied 5/10/24 0951)   ibuprofen tablet 600 mg (600 mg Oral Given 5/10/24 0951)     Medical Decision Making  47 yo M with pmhx hypothyroidism, developmental delay presents with a chief complaint of back pain status post mechanical trip and fall.    Differential includes, but is not limited to: Lumbar strain, lumbar fracture, spasm    No bowel or bladder incontinence or weakness to suggest cauda equina.  No fever or IVDU or immunosuppression to suggest epidural abscess.    Will administer NSAIDs and lidocaine patch.  Will obtain plain films.    Reassessment:  X-ray lateral projection is limited and bones are not completely visualized.  There is a 8 mm calcification just lateral to the right L4 transverse process.  I am uncertain whether this is a transverse process fracture given that is where his symptoms are versus a kidney stone.  Will obtain CT renal stone.  On repeat assessment, patient reports pain improving with lidocaine patch.    Reassessment:  CT confirms 8 mm nonobstructing right renal calculus.  No hydroureteronephrosis.  No acute traumatic injury.  On repeat assessment, patient wishes to leave.  Will discharge on course of lidocaine and NSAIDs as he experienced relief here.  Provided with return precautions.  Advised follow-up with PCP regarding kidney stone.        Amount and/or Complexity of Data Reviewed  Radiology: ordered.    Risk  Prescription drug management.                                      Clinical Impression:  Final diagnoses:  [W19.XXXA] Fall, initial encounter (Primary)  [S39.012A] Lumbar strain, initial encounter  [N20.0] Kidney stone          ED Disposition Condition    Discharge Stable          ED Prescriptions       Medication Sig Dispense Start Date  End Date Auth. Provider    ibuprofen (ADVIL,MOTRIN) 600 MG tablet Take 1 tablet (600 mg total) by mouth every 6 (six) hours as needed for Pain. 20 tablet 5/10/2024 -- Camron Joseph MD    LIDOcaine (LIDODERM) 5 % Place 1 patch onto the skin once daily. Remove & Discard patch within 12 hours or as directed by MD 9 patch 5/10/2024 -- Camron Joseph MD          Follow-up Information       Follow up With Specialties Details Why Contact Info    [facility] Emergency Medicine  As needed, If symptoms worsen [address]  [facility] [address] [phone]             Camron Joseph MD  05/10/24 1446

## 2024-05-10 NOTE — Clinical Note
"Sonu"Maximus Antunez was seen and treated in our emergency department on 5/10/2024.  He may return to work on 05/13/2024.       If you have any questions or concerns, please don't hesitate to call.      Camron Joseph MD"

## 2024-05-10 NOTE — DISCHARGE INSTRUCTIONS
Take ibuprofen as needed for pain.  Use a lidocaine patch as needed for pain.  You had no evidence of a fracture but you do have a 8 mm kidney stone in the right.  If you develop flank pain, discuss this with your primary care physician as you may need a referral to a urologist.  Return to the ER for any severe pain, weakness, numbness, difficulty controlling your bowels or bladder, or other concerning symptoms.

## 2024-06-06 ENCOUNTER — HOSPITAL ENCOUNTER (EMERGENCY)
Facility: HOSPITAL | Age: 49
Discharge: HOME | End: 2024-06-06
Attending: EMERGENCY MEDICINE
Payer: MEDICARE

## 2024-06-06 VITALS
WEIGHT: 248 LBS | TEMPERATURE: 98 F | SYSTOLIC BLOOD PRESSURE: 127 MMHG | HEART RATE: 100 BPM | BODY MASS INDEX: 32.87 KG/M2 | OXYGEN SATURATION: 97 % | RESPIRATION RATE: 18 BRPM | HEIGHT: 73 IN | DIASTOLIC BLOOD PRESSURE: 79 MMHG

## 2024-06-06 DIAGNOSIS — R21 RASH: ICD-10-CM

## 2024-06-06 DIAGNOSIS — L03.119 CELLULITIS OF UPPER EXTREMITY, UNSPECIFIED LATERALITY: Primary | ICD-10-CM

## 2024-06-06 PROCEDURE — 25000003 PHARM REV CODE 250

## 2024-06-06 PROCEDURE — 99284 EMERGENCY DEPT VISIT MOD MDM: CPT

## 2024-06-06 RX ORDER — SULFAMETHOXAZOLE AND TRIMETHOPRIM 800; 160 MG/1; MG/1
1 TABLET ORAL 2 TIMES DAILY
Qty: 14 TABLET | Refills: 0 | Status: SHIPPED | OUTPATIENT
Start: 2024-06-06 | End: 2024-06-13

## 2024-06-06 RX ORDER — SULFAMETHOXAZOLE AND TRIMETHOPRIM 800; 160 MG/1; MG/1
1 TABLET ORAL
Status: COMPLETED | OUTPATIENT
Start: 2024-06-06 | End: 2024-06-06

## 2024-06-06 RX ORDER — MUPIROCIN 20 MG/G
OINTMENT TOPICAL 3 TIMES DAILY
Qty: 30 G | Refills: 0 | Status: SHIPPED | OUTPATIENT
Start: 2024-06-06

## 2024-06-06 RX ADMIN — SULFAMETHOXAZOLE AND TRIMETHOPRIM 1 TABLET: 800; 160 TABLET ORAL at 08:06

## 2024-06-06 NOTE — Clinical Note
"Sonu"Maximus Antunez was seen and treated in our emergency department on 6/6/2024.  He may return to work on 06/10/2024.       If you have any questions or concerns, please don't hesitate to call.      Sabrina Caldwell PA-C"

## 2024-06-06 NOTE — ED PROVIDER NOTES
Encounter Date: 6/6/2024       History     Chief Complaint   Patient presents with    Skin Problem     Has pustules popping up and painful to arms      A 48-year-old male with cognitive delay and hypothyroidism presented to the emergency department with an erythematous pustular rash on his upper extremities bilaterally. The patient reported that the rash initially appeared on the left upper extremity but has since progressed to the right. He recalls experiencing a similar presentation several years ago. The patient denies any known triggers and denies intravenous drug use. He mentioned working outdoors currently and reported no new products such as soaps or detergents. There were no associated fevers or chills.      Review of patient's allergies indicates:   Allergen Reactions    Opioids - morphine analogues Itching    Penicillins Itching     Past Medical History:   Diagnosis Date    Cellulitis     Developmental delay     Hypothyroidism 09/19/2016    URI (upper respiratory infection)      Past Surgical History:   Procedure Laterality Date    CATARACT EXTRACTION W/ INTRAOCULAR LENS IMPLANT      CHOLECYSTECTOMY       Family History   Family history unknown: Yes     Social History     Tobacco Use    Smoking status: Never    Smokeless tobacco: Never   Substance Use Topics    Alcohol use: No    Drug use: No     Review of Systems  See HPI  Physical Exam     Initial Vitals [06/06/24 0813]   BP Pulse Resp Temp SpO2   127/79 100 18 98.2 °F (36.8 °C) 97 %      MAP       --         Physical Exam    Vitals reviewed.  Constitutional: He appears well-developed.   HENT:   Head: Normocephalic and atraumatic.   Eyes: Conjunctivae and EOM are normal.   Neck:   Normal range of motion.  Cardiovascular:  Normal rate.           Pulmonary/Chest: No respiratory distress.   Abdominal: Abdomen is soft. He exhibits no distension.   Musculoskeletal:         General: Normal range of motion.      Cervical back: Normal range of motion.      Neurological: He is alert and oriented to person, place, and time.   Skin: Skin is warm and dry. Rash noted.   Erythematous pustular lesions with 1 cm diameter diffusely on left upper extremity and sparsely on the right upper extremity.  No palpable areas of fluctuance on exam  No rash visualization on the chest abdomen back or lower extremities   Psychiatric: He has a normal mood and affect. Thought content normal.         ED Course   Procedures  Labs Reviewed - No data to display         Imaging Results    None          Medications   sulfamethoxazole-trimethoprim 800-160mg per tablet 1 tablet (1 tablet Oral Given 6/6/24 0827)     Medical Decision Making  A 48-year-old male presented to the emergency department with a new-onset rash. The initial differentials considered include cellulitis, abscess, folliculitis, and contact dermatitis. Notably, the presentation does not align with herpes zoster as it is bilateral and lacks a dermatomal pattern. I reviewed a similar presentation of the patient in 2015. Following discussion with the attending physician, we have decided to initiate oral and topical antibiotics along with strict return precautions. Given the recurrence of the episode, a referral to dermatology will also be arranged.  Patient discharged home    Risk  Prescription drug management.              Attending Attestation:     Physician Attestation Statement for NP/PA:   I personally made/approved the management plan and take responsibility for the patient management.    Other NP/PA Attestation Additions:      Medical Decision Making: Pt non-toxic appearing, without systemic symptoms.  Lesions suggestive of staph infection.  Will treat with bactrim and topical mupirocin for now with strict return precautions and PCP follow up. Referred to dermatology given repetitive episodes.                                    Clinical Impression:  Final diagnoses:  [L03.119] Cellulitis of upper extremity, unspecified  laterality (Primary)  [R21] Rash          ED Disposition Condition    Discharge Stable          ED Prescriptions       Medication Sig Dispense Start Date End Date Auth. Provider    sulfamethoxazole-trimethoprim 800-160mg (BACTRIM DS) 800-160 mg Tab Take 1 tablet by mouth 2 (two) times daily. for 7 days 14 tablet 6/6/2024 6/13/2024 Sabrina Caldwell PA-C    mupirocin (BACTROBAN) 2 % ointment Apply topically 3 (three) times daily. Apply to rash, wash hands before and after 30 g 6/6/2024 -- Sabrina Caldwell PA-C          Follow-up Information       Follow up With Specialties Details Why Contact Info Additional Information    [facility] Call   [address]  [facility] [address] [phone] Dermatology - Main Building, Clinic 11th Floor Please park in [facility]. Use Clinic elevators 12 & 13 to get to the 11th floor             Sabrina Caldwell PA-C  06/06/24 [id number]       Toño Giles MD  06/07/24 2013

## 2024-06-06 NOTE — DISCHARGE INSTRUCTIONS
This appears to be a bacterial infection on the skin  Do not to touch the rash and then other people or other parts of your bodies as this could cause it to spread  I do recommend wearing long sleeves  You will begin taking a oral antibiotic twice a day for the next 7 days.  I have also prescribed a antibiotic that you will rub on the rash but you should wash your hands before and after applying the antibiotic  If the rash continues to spread after 2 days of taking the medication or becomes larger or more painful you should return to the emergency department as this could get worse in you may need admission to the hospital  I have also placed a referral to dermatology so they can determine what exactly is causing this rash.  The clinic is listed above call to make an appointment

## 2024-06-06 NOTE — ED TRIAGE NOTES
Sonu Antunez, a 48 y.o. male presents to the ED w/ complaint of pustules on left arm, started Monday     Triage note:  Chief Complaint   Patient presents with    Skin Problem     Has pustules popping up and painful to arms      Review of patient's allergies indicates:   Allergen Reactions    Penicillins Itching     Past Medical History:   Diagnosis Date    Cellulitis     Developmental delay     Hypothyroidism 09/19/2016    URI (upper respiratory infection)          APPEARANCE: awake and alert in NAD. PAIN  5/10  SKIN: warm, dry and intact. No breakdown or bruising.  MUSCULOSKELETAL: Patient moving all extremities spontaneously, no obvious swelling or deformities noted. Ambulates independently.  RESPIRATORY: Denies shortness of breath.Respirations unlabored.   CARDIAC: Denies CP, 2+ distal pulses; no peripheral edema  ABDOMEN: S/ND/NT, Denies nausea  : voids spontaneously, denies difficulty  Neurologic: AAO x 4; follows commands equal strength in all extremities; denies numbness/tingling. Denies dizziness

## 2025-07-13 ENCOUNTER — HOSPITAL ENCOUNTER (EMERGENCY)
Facility: HOSPITAL | Age: 50
Discharge: HOME | End: 2025-07-13
Attending: STUDENT IN AN ORGANIZED HEALTH CARE EDUCATION/TRAINING PROGRAM
Payer: MEDICARE

## 2025-07-13 VITALS
HEIGHT: 73 IN | BODY MASS INDEX: 30.48 KG/M2 | HEART RATE: 107 BPM | RESPIRATION RATE: 20 BRPM | OXYGEN SATURATION: 96 % | WEIGHT: 230 LBS | TEMPERATURE: 98 F | DIASTOLIC BLOOD PRESSURE: 78 MMHG | SYSTOLIC BLOOD PRESSURE: 163 MMHG

## 2025-07-13 DIAGNOSIS — J02.9 PHARYNGITIS, UNSPECIFIED ETIOLOGY: Primary | ICD-10-CM

## 2025-07-13 LAB — GROUP A STREP MOLECULAR (OHS): NEGATIVE

## 2025-07-13 PROCEDURE — 87651 STREP A DNA AMP PROBE: CPT | Performed by: STUDENT IN AN ORGANIZED HEALTH CARE EDUCATION/TRAINING PROGRAM

## 2025-07-13 PROCEDURE — 99284 EMERGENCY DEPT VISIT MOD MDM: CPT | Mod: 25

## 2025-07-13 PROCEDURE — 63600175 PHARM REV CODE 636 W HCPCS: Performed by: STUDENT IN AN ORGANIZED HEALTH CARE EDUCATION/TRAINING PROGRAM

## 2025-07-13 PROCEDURE — 96372 THER/PROPH/DIAG INJ SC/IM: CPT | Performed by: STUDENT IN AN ORGANIZED HEALTH CARE EDUCATION/TRAINING PROGRAM

## 2025-07-13 RX ORDER — DEXAMETHASONE SODIUM PHOSPHATE 4 MG/ML
8 INJECTION, SOLUTION INTRA-ARTICULAR; INTRALESIONAL; INTRAMUSCULAR; INTRAVENOUS; SOFT TISSUE
Status: COMPLETED | OUTPATIENT
Start: 2025-07-13 | End: 2025-07-13

## 2025-07-13 RX ORDER — KETOROLAC TROMETHAMINE 30 MG/ML
15 INJECTION, SOLUTION INTRAMUSCULAR; INTRAVENOUS
Status: COMPLETED | OUTPATIENT
Start: 2025-07-13 | End: 2025-07-13

## 2025-07-13 RX ADMIN — DEXAMETHASONE SODIUM PHOSPHATE 8 MG: 4 INJECTION INTRA-ARTICULAR; INTRALESIONAL; INTRAMUSCULAR; INTRAVENOUS; SOFT TISSUE at 04:07

## 2025-07-13 RX ADMIN — KETOROLAC TROMETHAMINE 15 MG: 30 INJECTION, SOLUTION INTRAMUSCULAR at 04:07

## 2025-07-13 NOTE — ED PROVIDER NOTES
"Chief Complaint   Sore Throat (Sore throat and cough x couple days)      History Of Present Illness   Sonu Antunez is a 49 y.o. male with no pertinent PMHx presenting with sore throat.  Patient reports a few days of sore throat and cough.  He reports coughing up clear mucus.  He reports no congestion, rhinorrhea, fevers, or chills.  He reports no chest pain, or shortness of breath.  He reports no vomiting, or diarrhea.  He reports some pain with swallowing but states he is able to tolerate his oral secretions and drink water without difficulty.    Independent Historian: Yes  Other Historian or Collateral: Chart review  Interpretor: No      Review of patient's allergies indicates:   Allergen Reactions    Opioids - morphine analogues Itching    Penicillins Itching       Medications Ordered Prior to Encounter[1]    Past History   As per HPI and below:  Past Medical History:   Diagnosis Date    Cellulitis     Developmental delay     Hypothyroidism 09/19/2016    URI (upper respiratory infection)      Past Surgical History:   Procedure Laterality Date    CATARACT EXTRACTION W/ INTRAOCULAR LENS IMPLANT      CHOLECYSTECTOMY         Social History     Socioeconomic History    Marital status: Single   Tobacco Use    Smoking status: Never    Smokeless tobacco: Never   Substance and Sexual Activity    Alcohol use: No    Drug use: No    Sexual activity: Never       Family History   Family history unknown: Yes       Physical Exam     Vitals:    07/13/25 0347 07/13/25 0509   BP: (!) 163/78 (!) 163/78   BP Location:  Right arm   Patient Position:  Sitting   Pulse: 107 107   Resp: 18 20   Temp: 98 °F (36.7 °C) 98 °F (36.7 °C)   TempSrc: Oral Oral   SpO2: 96% 96%   Weight: 104.3 kg (230 lb)    Height: 6' 1" (1.854 m)        Physical Exam  Constitutional:       General: He is not in acute distress.     Appearance: He is well-developed. He is not toxic-appearing or diaphoretic.   HENT:      Head: Normocephalic and atraumatic.      " Nose: Nose normal. No congestion.      Mouth/Throat:      Mouth: Mucous membranes are moist.      Pharynx: Oropharynx is clear. Uvula midline. Posterior oropharyngeal erythema present. No oropharyngeal exudate or uvula swelling.      Comments: Minimal bilateral tonsillar swelling and erythema without exudate  Eyes:      Extraocular Movements: Extraocular movements intact.      Pupils: Pupils are equal, round, and reactive to light.   Cardiovascular:      Rate and Rhythm: Normal rate and regular rhythm.   Pulmonary:      Effort: No respiratory distress.      Breath sounds: No wheezing or rhonchi.   Abdominal:      General: There is no distension.      Tenderness: There is no abdominal tenderness. There is no guarding.   Musculoskeletal:         General: No swelling or deformity.      Cervical back: Normal range of motion. No rigidity.   Skin:     General: Skin is warm and dry.      Capillary Refill: Capillary refill takes less than 2 seconds.   Neurological:      General: No focal deficit present.      Mental Status: He is alert and oriented to person, place, and time.             Results     Labs Reviewed   GROUP A STREP, MOLECULAR - Normal       Result Value    Group A Strep Molecular Negative      Narrative:     Arcanobacterium haemolyticum and Beta Streptococcus group C and G will not be detected by this test method.  Please order Throat Culture ([id number]) if suspected.                                           Imaging Results    None           Initial MDM   Medical Decision Making  Patient is a 49-year-old man presenting with sore throat.  Given that has posterior oropharynx erythema, Will swab and test for strep.  Discussed continued treatment with Tylenol and Motrin.  Will give dose of steroids in the ED given his pain with swallowing.  Very low suspicion for peritonsillar abscess or retropharyngeal abscess though given the bilateral and equal nature.    Amount and/or Complexity of Data Reviewed  Labs:   Decision-making details documented in ED Course.    Risk  Prescription drug management.                     Medications Given / Interventions     Medications   ketorolac injection 15 mg (15 mg Intramuscular Given 7/13/25 0423)   dexAMETHasone injection 8 mg (8 mg Intramuscular Given 7/13/25 0423)       Procedures     ED POCUS Performed: No    Reassessment and ED Course     ED Course as of 07/25/25 2134   Sun Jul 13, 2025   0501 Group A Strep, Molecular: Negative [CH]      ED Course User Index  [CH] Iesha Joel MD     Discussed results with the patient.  Discussed continued symptomatic management.  DC to home with return precautions.         Final diagnoses:  [J02.9] Pharyngitis, unspecified etiology (Primary)                 Dispo      ED Disposition Condition    Discharge Stable            ED Prescriptions    None       Follow-up Information    None                       [1]   No current facility-administered medications on file prior to encounter.     Current Outpatient Medications on File Prior to Encounter   Medication Sig Dispense Refill    acetaminophen (TYLENOL) 325 MG tablet Take 650 mg by mouth every 6 (six) hours as needed for Pain.      carbamide peroxide (DEBROX) 6.5 % otic solution Place 5 drops into both ears as needed. (Patient not taking: Reported on 4/3/2024) 15 mL 0    ibuprofen (ADVIL,MOTRIN) 600 MG tablet Take 1 tablet (600 mg total) by mouth every 6 (six) hours as needed for Pain. 20 tablet 0    levothyroxine (SYNTHROID) 100 MCG tablet Take 100 mcg by mouth once daily.      LIDOcaine (LIDODERM) 5 % Place 1 patch onto the skin once daily. Remove & Discard patch within 12 hours or as directed by MD Jackson patch 0    mupirocin (BACTROBAN) 2 % ointment Apply topically 3 (three) times daily. Apply to rash, wash hands before and after 30 g 0    naproxen (NAPROSYN) 500 MG tablet Take 1 tablet (500 mg total) by mouth every 12 (twelve) hours as needed (pain and inflammation). Take with food. (Patient  not taking: Reported on 4/3/2024) 20 tablet 0    tramadol (ULTRAM) 50 mg tablet Take 1 or 2 tablets every 6 hours as needed for pain.  Do not drive or operate dangerous machinery while on this medication.  MEDICATION MAY MAKE YOU MORE PRONE TO FALLING. 20 tablet 0        Iesha Joel MD  07/25/25 [id number]

## 2025-07-13 NOTE — ED NOTES
Patient identifiers for Sonu Antunez 49 y.o. male checked and correct.  Chief Complaint   Patient presents with    Sore Throat     Sore throat and cough x couple days     Past Medical History:   Diagnosis Date    Cellulitis     Developmental delay     Hypothyroidism 09/19/2016    URI (upper respiratory infection)      Allergies reported:   Review of patient's allergies indicates:   Allergen Reactions    Opioids - morphine analogues Itching    Penicillins Itching       Appearance: Pt awake, alert & oriented to person, place & time. Pt in no acute distress at present time. Pt is clean and well groomed with clothes appropriately fastened.   Skin: Skin warm, dry & intact. Color consistent with ethnicity. Mucous membranes moist. No breakdown or brusing noted.   Musculoskeletal: Patient moving all extremities well, no obvious swelling or deformities noted.   Respiratory: Respirations spontaneous, even, and non-labored. Visible chest rise noted. Airway is open and patent. Cough and sore throat   Neurologic: Sensation is intact. Speech is clear and appropriate. Eyes open spontaneously, behavior appropriate to situation, follows commands, facial expression symmetrical, bilateral hand grasp equal and even, purposeful motor response noted.  Cardiac: All peripheral pulses present. No Bilateral lower extremity edema. Cap refill is <3 seconds.  Abdomen: Abdomen soft, non distended, non tender to palpation.   : Pt voids independently, denies dysuria, hematuria, frequency.

## 2025-07-13 NOTE — ED TRIAGE NOTES
Sonu Antunez, a 49 y.o. male presents to the ED w/ complaint of cough and sore throat patient needs MD note to return to work     Triage note:  Chief Complaint   Patient presents with    Sore Throat     Sore throat and cough x couple days     Review of patient's allergies indicates:   Allergen Reactions    Opioids - morphine analogues Itching    Penicillins Itching     Past Medical History:   Diagnosis Date    Cellulitis     Developmental delay     Hypothyroidism 09/19/2016    URI (upper respiratory infection)

## 2025-07-13 NOTE — Clinical Note
"Sonu Leal"Tulio was seen and treated in our emergency department on 7/13/2025.  He may return to work on 07/14/2025.       If you have any questions or concerns, please don't hesitate to call.       RN    "